# Patient Record
Sex: MALE | Race: WHITE | NOT HISPANIC OR LATINO | Employment: FULL TIME | URBAN - METROPOLITAN AREA
[De-identification: names, ages, dates, MRNs, and addresses within clinical notes are randomized per-mention and may not be internally consistent; named-entity substitution may affect disease eponyms.]

---

## 2023-09-14 LAB
ALANINE AMINOTRANSFERASE (SGPT) (U/L) IN SER/PLAS: 42 U/L (ref 10–52)
ALBUMIN (G/DL) IN SER/PLAS: 4.5 G/DL (ref 3.4–5)
ALKALINE PHOSPHATASE (U/L) IN SER/PLAS: 65 U/L (ref 33–120)
ANION GAP IN SER/PLAS: 13 MMOL/L (ref 10–20)
ASPARTATE AMINOTRANSFERASE (SGOT) (U/L) IN SER/PLAS: 20 U/L (ref 9–39)
BASOPHILS (10*3/UL) IN BLOOD BY AUTOMATED COUNT: 0.03 X10E9/L (ref 0–0.1)
BASOPHILS/100 LEUKOCYTES IN BLOOD BY AUTOMATED COUNT: 0.5 % (ref 0–2)
BILIRUBIN TOTAL (MG/DL) IN SER/PLAS: 0.5 MG/DL (ref 0–1.2)
CALCIUM (MG/DL) IN SER/PLAS: 9.5 MG/DL (ref 8.6–10.3)
CARBON DIOXIDE, TOTAL (MMOL/L) IN SER/PLAS: 30 MMOL/L (ref 21–32)
CHLORIDE (MMOL/L) IN SER/PLAS: 103 MMOL/L (ref 98–107)
CHOLESTEROL (MG/DL) IN SER/PLAS: 213 MG/DL (ref 0–199)
CHOLESTEROL IN HDL (MG/DL) IN SER/PLAS: 43.9 MG/DL
CHOLESTEROL/HDL RATIO: 4.9
COBALAMIN (VITAMIN B12) (PG/ML) IN SER/PLAS: 283 PG/ML (ref 211–911)
CREATININE (MG/DL) IN SER/PLAS: 0.86 MG/DL (ref 0.5–1.3)
EOSINOPHILS (10*3/UL) IN BLOOD BY AUTOMATED COUNT: 0.07 X10E9/L (ref 0–0.7)
EOSINOPHILS/100 LEUKOCYTES IN BLOOD BY AUTOMATED COUNT: 1.1 % (ref 0–6)
ERYTHROCYTE DISTRIBUTION WIDTH (RATIO) BY AUTOMATED COUNT: 12.9 % (ref 11.5–14.5)
ERYTHROCYTE MEAN CORPUSCULAR HEMOGLOBIN CONCENTRATION (G/DL) BY AUTOMATED: 32.9 G/DL (ref 32–36)
ERYTHROCYTE MEAN CORPUSCULAR VOLUME (FL) BY AUTOMATED COUNT: 95 FL (ref 80–100)
ERYTHROCYTES (10*6/UL) IN BLOOD BY AUTOMATED COUNT: 4.81 X10E12/L (ref 4.5–5.9)
GFR MALE: >90 ML/MIN/1.73M2
GLUCOSE (MG/DL) IN SER/PLAS: 94 MG/DL (ref 74–99)
HEMATOCRIT (%) IN BLOOD BY AUTOMATED COUNT: 45.6 % (ref 41–52)
HEMOGLOBIN (G/DL) IN BLOOD: 15 G/DL (ref 13.5–17.5)
IMMATURE GRANULOCYTES/100 LEUKOCYTES IN BLOOD BY AUTOMATED COUNT: 0.2 % (ref 0–0.9)
LDL: 124 MG/DL (ref 0–99)
LEUKOCYTES (10*3/UL) IN BLOOD BY AUTOMATED COUNT: 6.5 X10E9/L (ref 4.4–11.3)
LYMPHOCYTES (10*3/UL) IN BLOOD BY AUTOMATED COUNT: 2.38 X10E9/L (ref 1.2–4.8)
LYMPHOCYTES/100 LEUKOCYTES IN BLOOD BY AUTOMATED COUNT: 36.6 % (ref 13–44)
MONOCYTES (10*3/UL) IN BLOOD BY AUTOMATED COUNT: 0.49 X10E9/L (ref 0.1–1)
MONOCYTES/100 LEUKOCYTES IN BLOOD BY AUTOMATED COUNT: 7.5 % (ref 2–10)
NEUTROPHILS (10*3/UL) IN BLOOD BY AUTOMATED COUNT: 3.52 X10E9/L (ref 1.2–7.7)
NEUTROPHILS/100 LEUKOCYTES IN BLOOD BY AUTOMATED COUNT: 54.1 % (ref 40–80)
NON HDL CHOLESTEROL: 169 MG/DL
PLATELETS (10*3/UL) IN BLOOD AUTOMATED COUNT: 201 X10E9/L (ref 150–450)
POTASSIUM (MMOL/L) IN SER/PLAS: 4.6 MMOL/L (ref 3.5–5.3)
PROTEIN TOTAL: 7.3 G/DL (ref 6.4–8.2)
SODIUM (MMOL/L) IN SER/PLAS: 141 MMOL/L (ref 136–145)
THYROTROPIN (MIU/L) IN SER/PLAS BY DETECTION LIMIT <= 0.05 MIU/L: 1.87 MIU/L (ref 0.44–3.98)
TRIGLYCERIDE (MG/DL) IN SER/PLAS: 228 MG/DL (ref 0–149)
UREA NITROGEN (MG/DL) IN SER/PLAS: 17 MG/DL (ref 6–23)
VLDL: 46 MG/DL (ref 0–40)

## 2023-10-03 ENCOUNTER — OFFICE VISIT (OUTPATIENT)
Dept: BEHAVIORAL HEALTH | Facility: CLINIC | Age: 58
End: 2023-10-03
Payer: COMMERCIAL

## 2023-10-03 DIAGNOSIS — F33.1 MODERATE EPISODE OF RECURRENT MAJOR DEPRESSIVE DISORDER (MULTI): Primary | ICD-10-CM

## 2023-10-03 DIAGNOSIS — F41.9 ANXIETY: ICD-10-CM

## 2023-10-03 PROCEDURE — 99214 OFFICE O/P EST MOD 30 MIN: CPT | Performed by: CLINICAL NURSE SPECIALIST

## 2023-10-03 RX ORDER — SERTRALINE HYDROCHLORIDE 100 MG/1
100 TABLET, FILM COATED ORAL DAILY
Qty: 90 TABLET | Refills: 0 | Status: SHIPPED | OUTPATIENT
Start: 2023-10-03 | End: 2024-01-19 | Stop reason: SDUPTHER

## 2023-10-03 RX ORDER — SERTRALINE HYDROCHLORIDE 100 MG/1
100 TABLET, FILM COATED ORAL ONCE
Status: CANCELLED | OUTPATIENT
Start: 2023-10-03 | End: 2023-10-03

## 2023-10-03 NOTE — PROGRESS NOTES
Outpatient Psychiatry      Subjective patient presents as an established patient for an outpatient appointment /medication management in psychiatry         Assessment/Plan   Diagnosis: There is no problem list on file for this patient.      Treatment Goals:  Specify outcomes written in observable, behavioral terms:   Anxiety: eliminating avoidance (specify encourage continued expression of thoughts and feelings with support for stressors of dealing with medication condition )  Depression: increasing energy, increasing interest in usual activities, increasing motivation, reducing fatigue, and reducing negative automatic thoughts    Treatment Plan/Recommendations: continue self care and wellness efforts and maintain routine health screenings , can follow up for meds in 4 weeks , can call  for treatment and scheduling concerns   Follow-up plan for depression and anxiety was discussed with patient. Sertraline 100 mg daily     Review with patient: Treatment plan reviewed with the patient.  Medication risks/benefit reviewed with the patient      HPI: identifies family as supportive , thinks there is some benefit from sertraline and is willing to continue this medication , psychoeducation provided .attends medical appointments regularly . He spoke about having a tendency to self isolate in a particular room at home , discussed how he could view this differently as this is where he feels more relaxed. Mood still depressed with withdrawls from social events and feels self conscious about these events.no worsened cognitive functioning , pain impacts mood and activities , reviewed notes in the [facility] emr from appointments with other medical providers , no thoughts of harming himself or others , no altered thought processes on assessment , readily participated in discussion     Current Medications:    Current Outpatient Medications:     fremanezumab (Ajovy) 225 mg/1.5 mL auto-injector, INJECT 225 MG (1 PEN) UNDER THE  SKIN ONCE A MONTH AS DIRECTED., Disp: 1.5 mL, Rfl: 11  Sertraline has been 25 mg 3 tablets daily , patient is willing to increase to 100 mg , 1 tablet daily   Medical History:  Past Medical History:   Diagnosis Date    Personal history of other diseases of the nervous system and sense organs     History of hearing loss    Personal history of other specified conditions     History of snoring    Unspecified nonsuppurative otitis media, right ear 10/21/2020    Fluid level behind tympanic membrane of right ear     Surgical History:  Past Surgical History:   Procedure Laterality Date    OTHER SURGICAL HISTORY  08/19/2020    Shoulder surgery    OTHER SURGICAL HISTORY  01/20/2022    Craniotomy     Family History:  No family history on file.  Social History:  Social History     Socioeconomic History    Marital status:      Spouse name: Not on file    Number of children: Not on file    Years of education: Not on file    Highest education level: Not on file   Occupational History    Not on file   Tobacco Use    Smoking status: Not on file    Smokeless tobacco: Not on file   Substance and Sexual Activity    Alcohol use: Not on file    Drug use: Not on file    Sexual activity: Not on file   Other Topics Concern    Not on file   Social History Narrative    Not on file     Social Determinants of Health     Financial Resource Strain: Not on file   Food Insecurity: Not on file   Transportation Needs: Not on file   Physical Activity: Not on file   Stress: Not on file   Social Connections: Not on file   Intimate Partner Violence: Not on file   Housing Stability: Not on file       Additional historical information includes: not applicable     Record Review: brief     Medical Review Of Systems:  Musculoskeletal:positive for balance issues econdary to medical condition   Neurological: positive for coordination problems, gait problems, and speech problems    Psychiatric Review Of Systems:  Depressive Symptoms:Depressed/Irritable  mood, Diminished interest, Worthlessness or guilt, Poor concentration, Fatigue, and N/A  Manic Symptoms:Other: (comment) none   Anxiety Symptoms:Excessive worry, Difficulty controlling worry, Easily fatigued due to worry, Difficulty concentration due to worry, and Restlessness/Feeling on edge due to worry  Disordered Eating Symptoms : not applicable   Inattentive Symptoms: not applicable   Hyperactive/Impulsive Symptoms:not applicable   Oppositional Defiant Symptoms:Other: (comment) not applicable   Trauma Symptoms:has triggers to things that make him more anxious with self doubt , medical condition and resulting changes to activities have contributed to this   Conduct Issues:Other: (comment) not applicable   Psychotic Symptoms:Other: (comment) none   Developmental Concerns:Other: (comment) not applicable   Other Symptoms/Concerns:Other: (comments) none   Delirium/Altered Mental Status Symptoms:Other: (comment) not applicable          Padmnii Gerard, APRN-CNS

## 2023-10-09 ENCOUNTER — PHARMACY VISIT (OUTPATIENT)
Dept: PHARMACY | Facility: CLINIC | Age: 58
End: 2023-10-09
Payer: MEDICARE

## 2023-10-09 ENCOUNTER — SPECIALTY PHARMACY (OUTPATIENT)
Dept: PHARMACY | Facility: CLINIC | Age: 58
End: 2023-10-09

## 2023-10-09 PROCEDURE — RXMED WILLOW AMBULATORY MEDICATION CHARGE

## 2023-10-11 ENCOUNTER — SOCIAL WORK (OUTPATIENT)
Dept: BEHAVIORAL HEALTH | Facility: CLINIC | Age: 58
End: 2023-10-11
Payer: COMMERCIAL

## 2023-10-11 DIAGNOSIS — F33.2 SEVERE EPISODE OF RECURRENT MAJOR DEPRESSIVE DISORDER, WITHOUT PSYCHOTIC FEATURES (MULTI): ICD-10-CM

## 2023-10-11 PROCEDURE — 90837 PSYTX W PT 60 MINUTES: CPT

## 2023-10-11 NOTE — PROGRESS NOTES
"    Type of Interaction: In Office    Start Time: 2:00pm    End Time: 3:00pm    Patient states that his wife became upset with him on the drive to this appointment. Patient's wife expressed that she is caring for everyone in her life (mother, patient) and feels lonely. Patient recognizes that his depression has negatively impacted his marriage. Patient knows he should \"do better' and be more involved in their relationship. Patient continues to struggle in social situations. Patient reports he becomes overwhelmed and abruptly leaves. Spoke with patient about labeling the negative thoughts that he has. Patient did schedule an appointment the [facility]. Plan to meet again in three weeks.     Appointment: Scheduled    Interventions Provided: Solution Focused Therapy      Progress Made: Moderate    Response to Intervention: Patient receptive to suggestions on how to better manage his depressive symptoms.     Plan: Discussed not spending time isolating in one room of his house. Patient would like to try and sleep his bedroom one night a week.     Follow Up / Next Appointment:  November 1st at 2pm      "

## 2023-10-28 DIAGNOSIS — R51.9 HEADACHE, UNSPECIFIED: ICD-10-CM

## 2023-10-31 RX ORDER — VERAPAMIL HYDROCHLORIDE 80 MG/1
TABLET ORAL
Qty: 360 TABLET | Refills: 1 | Status: SHIPPED | OUTPATIENT
Start: 2023-10-31 | End: 2024-04-08

## 2023-11-01 ENCOUNTER — SOCIAL WORK (OUTPATIENT)
Dept: BEHAVIORAL HEALTH | Facility: CLINIC | Age: 58
End: 2023-11-01
Payer: COMMERCIAL

## 2023-11-01 DIAGNOSIS — F33.1 MODERATE EPISODE OF RECURRENT MAJOR DEPRESSIVE DISORDER (MULTI): ICD-10-CM

## 2023-11-01 PROCEDURE — 90837 PSYTX W PT 60 MINUTES: CPT

## 2023-11-01 NOTE — PROGRESS NOTES
Collaborative Care (CoCM)  Progress Note    Type of Interaction: In Office    Start Time: 2:00pm    End Time: 3:00pm    Patient visited his son at his college for parents weekend. Patient reports the trip went well. He tries to stay consistent with taking his wife to dinner. Patient's headaches have lessened now has them once weekly. He does struggle with feeling nauseated. It's worse when he's sleeping at night. Patient wakes up several times during the night. Last night patient was up till 5:30am. Patient is concerned that his  disability insurance company is asking for his therapy notes. Discussed with patient directing them to medical records. Patient speaking about his on going health issues and it's impact on his mental health. Patient thinks he's accepted not being able to work again.    Appointment: Scheduled      Interventions Provided: Solution Focused Therapy      Progress Made: Moderate    Response to Intervention: Receptive     Plan: Continue to work on challenging negative thoughts and not isolating from others.       Follow Up / Next Appointment:  November 29th at 2pm

## 2023-11-07 ENCOUNTER — PHARMACY VISIT (OUTPATIENT)
Dept: PHARMACY | Facility: CLINIC | Age: 58
End: 2023-11-07
Payer: MEDICARE

## 2023-11-07 PROCEDURE — RXMED WILLOW AMBULATORY MEDICATION CHARGE

## 2023-11-08 ENCOUNTER — ANCILLARY PROCEDURE (OUTPATIENT)
Dept: RADIOLOGY | Facility: CLINIC | Age: 58
End: 2023-11-08
Payer: COMMERCIAL

## 2023-11-08 DIAGNOSIS — D33.3 BENIGN NEOPLASM OF CRANIAL NERVES (MULTI): Primary | ICD-10-CM

## 2023-11-08 DIAGNOSIS — G51.0 BELL'S PALSY: ICD-10-CM

## 2023-11-08 PROCEDURE — 70553 MRI BRAIN STEM W/O & W/DYE: CPT

## 2023-11-08 PROCEDURE — 2550000001 HC RX 255 CONTRASTS: Performed by: OTOLARYNGOLOGY

## 2023-11-08 PROCEDURE — 70553 MRI BRAIN STEM W/O & W/DYE: CPT | Performed by: RADIOLOGY

## 2023-11-08 PROCEDURE — A9575 INJ GADOTERATE MEGLUMI 0.1ML: HCPCS | Performed by: OTOLARYNGOLOGY

## 2023-11-08 RX ORDER — GADOTERATE MEGLUMINE 376.9 MG/ML
20 INJECTION INTRAVENOUS
Status: COMPLETED | OUTPATIENT
Start: 2023-11-08 | End: 2023-11-08

## 2023-11-08 RX ADMIN — GADOTERATE MEGLUMINE 20 ML: 376.9 INJECTION INTRAVENOUS at 11:41

## 2023-11-14 ENCOUNTER — SPECIALTY PHARMACY (OUTPATIENT)
Dept: PHARMACY | Facility: CLINIC | Age: 58
End: 2023-11-14

## 2023-11-14 ENCOUNTER — TELEPHONE (OUTPATIENT)
Dept: OPERATING ROOM | Facility: HOSPITAL | Age: 58
End: 2023-11-14
Payer: COMMERCIAL

## 2023-11-14 DIAGNOSIS — D33.3 ACOUSTIC NEUROMA (MULTI): ICD-10-CM

## 2023-11-14 PROBLEM — G51.8: Status: ACTIVE | Noted: 2023-11-14

## 2023-11-14 PROBLEM — B96.89 OTH BACTERIAL AGENTS AS THE CAUSE OF DISEASES CLASSD ELSWHR: Status: ACTIVE | Noted: 2022-01-25

## 2023-11-14 PROBLEM — F32.A DEPRESSION: Status: ACTIVE | Noted: 2023-11-14

## 2023-11-14 PROBLEM — E78.5 HYPERLIPIDEMIA: Status: ACTIVE | Noted: 2023-11-14

## 2023-11-14 PROBLEM — G03.9 MENINGITIS (HHS-HCC): Status: ACTIVE | Noted: 2023-11-14

## 2023-11-14 PROBLEM — M54.50 LUMBAGO: Status: ACTIVE | Noted: 2023-11-14

## 2023-11-14 PROBLEM — G00.9: Status: ACTIVE | Noted: 2023-11-14

## 2023-11-14 PROBLEM — F09 COGNITIVE DYSFUNCTION: Status: ACTIVE | Noted: 2023-11-14

## 2023-11-14 PROBLEM — H93.11 TINNITUS, RIGHT: Status: ACTIVE | Noted: 2023-11-14

## 2023-11-14 PROBLEM — R11.2 NAUSEA AND/OR VOMITING: Status: ACTIVE | Noted: 2023-11-14

## 2023-11-14 PROBLEM — R29.810 FACIAL WEAKNESS: Status: ACTIVE | Noted: 2022-01-15

## 2023-11-14 PROBLEM — H60.90 OTITIS EXTERNA: Status: ACTIVE | Noted: 2023-11-14

## 2023-11-14 PROBLEM — R26.89 OTHER ABNORMALITIES OF GAIT AND MOBILITY: Status: ACTIVE | Noted: 2022-01-10

## 2023-11-14 PROBLEM — G02: Status: ACTIVE | Noted: 2023-11-14

## 2023-11-14 PROBLEM — R42 VERTIGO: Status: ACTIVE | Noted: 2023-11-14

## 2023-11-14 PROBLEM — H65.499 CHRONIC OTITIS MEDIA WITH EFFUSION: Status: ACTIVE | Noted: 2023-11-14

## 2023-11-14 PROBLEM — H90.A22 SENSORINEURAL HEARING LOSS (SNHL) OF LEFT EAR WITH RESTRICTED HEARING OF RIGHT EAR: Status: ACTIVE | Noted: 2023-11-14

## 2023-11-14 PROBLEM — J35.01 CHRONIC TONSILLITIS: Status: ACTIVE | Noted: 2023-11-14

## 2023-11-14 PROBLEM — B37.0 THRUSH, ORAL: Status: ACTIVE | Noted: 2023-11-14

## 2023-11-14 PROBLEM — R26.89 IMBALANCE: Status: ACTIVE | Noted: 2023-11-14

## 2023-11-14 PROBLEM — R05.3 PERSISTENT COUGH FOR 3 WEEKS OR LONGER: Status: ACTIVE | Noted: 2023-11-14

## 2023-11-14 PROBLEM — H93.8X1 PRESSURE SENSATION IN RIGHT EAR: Status: ACTIVE | Noted: 2023-11-14

## 2023-11-14 PROBLEM — H90.3 ASYMMETRIC SNHL (SENSORINEURAL HEARING LOSS): Status: ACTIVE | Noted: 2023-11-14

## 2023-11-14 PROBLEM — H04.121: Status: ACTIVE | Noted: 2023-11-14

## 2023-11-14 PROBLEM — Z79.2 LONG TERM (CURRENT) USE OF ANTIBIOTICS: Status: ACTIVE | Noted: 2022-01-25

## 2023-11-14 PROBLEM — H90.71 MIXED HEARING LOSS OF RIGHT EAR: Status: ACTIVE | Noted: 2023-11-14

## 2023-11-14 PROBLEM — R42 DIZZINESS: Status: ACTIVE | Noted: 2023-11-14

## 2023-11-14 PROBLEM — R26.81 UNSTEADY GAIT: Status: ACTIVE | Noted: 2023-11-14

## 2023-11-14 PROBLEM — T85.79XA: Status: ACTIVE | Noted: 2023-11-14

## 2023-11-14 PROBLEM — Z79.899 OTHER LONG TERM (CURRENT) DRUG THERAPY: Status: ACTIVE | Noted: 2022-01-25

## 2023-11-14 PROBLEM — F32.9 MAJOR DEPRESSION: Status: ACTIVE | Noted: 2023-11-14

## 2023-11-14 PROBLEM — H90.5 LEFT-SIDED SENSORINEURAL HEARING LOSS: Status: ACTIVE | Noted: 2023-11-14

## 2023-11-14 PROBLEM — G96.08: Status: ACTIVE | Noted: 2022-01-25

## 2023-11-14 PROBLEM — R47.01 EXPRESSIVE APHASIA: Status: ACTIVE | Noted: 2023-11-14

## 2023-11-14 PROBLEM — J34.89 RHINORRHEA: Status: ACTIVE | Noted: 2023-11-14

## 2023-11-14 PROBLEM — H73.892 RETRACTION OF TYMPANIC MEMBRANE OF LEFT EAR: Status: ACTIVE | Noted: 2023-11-14

## 2023-11-14 PROBLEM — J35.8 ASYMMETRIC TONSILS: Status: ACTIVE | Noted: 2023-11-14

## 2023-11-14 PROBLEM — E87.6 HYPOKALEMIA: Status: ACTIVE | Noted: 2023-11-14

## 2023-11-14 PROBLEM — F41.1 ANXIETY REACTION: Status: ACTIVE | Noted: 2023-11-14

## 2023-11-14 PROBLEM — H70.11 CHRONIC MASTOIDITIS OF RIGHT SIDE: Status: ACTIVE | Noted: 2023-11-14

## 2023-11-14 PROBLEM — R51.9 PERSISTENT HEADACHES: Status: ACTIVE | Noted: 2023-11-14

## 2023-11-14 PROBLEM — G47.00 INSOMNIA: Status: ACTIVE | Noted: 2023-11-14

## 2023-11-14 RX ORDER — NYSTATIN 100000 [USP'U]/ML
SUSPENSION ORAL 4 TIMES DAILY
COMMUNITY
Start: 2022-03-10 | End: 2023-12-19 | Stop reason: WASHOUT

## 2023-11-14 RX ORDER — PROMETHAZINE HYDROCHLORIDE 25 MG/1
TABLET ORAL
COMMUNITY
Start: 2022-02-09 | End: 2023-12-19 | Stop reason: WASHOUT

## 2023-11-14 RX ORDER — RIBOFLAVIN (VITAMIN B2) 100 MG
200 TABLET ORAL 2 TIMES DAILY
COMMUNITY
Start: 2023-09-11

## 2023-11-14 RX ORDER — GUAIFENESIN 1200 MG
TABLET, EXTENDED RELEASE 12 HR ORAL
COMMUNITY
Start: 2022-01-20

## 2023-11-14 RX ORDER — RIZATRIPTAN BENZOATE 10 MG/1
TABLET ORAL
COMMUNITY

## 2023-11-14 RX ORDER — ACETAMINOPHEN 500 MG
TABLET ORAL
COMMUNITY
Start: 2022-01-18 | End: 2023-12-19 | Stop reason: WASHOUT

## 2023-11-14 RX ORDER — SODIUM CHLORIDE 0.9 % (FLUSH) 0.9 %
SYRINGE (ML) INJECTION
COMMUNITY
Start: 2022-02-09

## 2023-11-14 RX ORDER — ONDANSETRON 4 MG/1
TABLET, FILM COATED ORAL
COMMUNITY
Start: 2022-01-13

## 2023-11-14 RX ORDER — IPRATROPIUM BROMIDE 21 UG/1
SPRAY, METERED NASAL
COMMUNITY

## 2023-11-14 RX ORDER — OXYCODONE HYDROCHLORIDE 5 MG/1
TABLET ORAL
COMMUNITY
Start: 2022-01-18 | End: 2023-12-19 | Stop reason: WASHOUT

## 2023-11-14 RX ORDER — LISDEXAMFETAMINE DIMESYLATE 70 MG/1
1 CAPSULE ORAL DAILY
COMMUNITY
Start: 2009-09-15 | End: 2023-12-19 | Stop reason: WASHOUT

## 2023-11-14 RX ORDER — ONDANSETRON 4 MG/1
1 TABLET, FILM COATED ORAL EVERY 6 HOURS PRN
COMMUNITY
Start: 2022-02-08 | End: 2023-12-19 | Stop reason: WASHOUT

## 2023-11-14 RX ORDER — CYPROHEPTADINE HYDROCHLORIDE 4 MG/1
TABLET ORAL
COMMUNITY
Start: 2022-12-27 | End: 2024-03-27 | Stop reason: WASHOUT

## 2023-11-14 RX ORDER — SUMATRIPTAN 50 MG/1
TABLET, FILM COATED ORAL
COMMUNITY
Start: 2023-09-20

## 2023-11-14 RX ORDER — METHOCARBAMOL 500 MG/1
TABLET, FILM COATED ORAL
COMMUNITY
Start: 2022-02-09

## 2023-11-14 RX ORDER — TOPIRAMATE 25 MG/1
TABLET ORAL
COMMUNITY
Start: 2023-05-05 | End: 2023-12-19 | Stop reason: WASHOUT

## 2023-11-14 RX ORDER — LORAZEPAM 0.5 MG/1
TABLET ORAL
COMMUNITY
Start: 2022-03-28

## 2023-11-14 RX ORDER — VERAPAMIL HYDROCHLORIDE 80 MG/1
1 TABLET ORAL 2 TIMES DAILY
COMMUNITY
Start: 2022-06-08 | End: 2023-12-19 | Stop reason: WASHOUT

## 2023-11-14 NOTE — TELEPHONE ENCOUNTER
Dr. Manrique reviewed recent MRI imaging. Per Dr. Manrique, the patient's imaging is stable from last year and another MRI should be completed in 1 year. Message left with results and instructions. Office number left with voicemail if he has additional questions. No other needs at this time.

## 2023-11-21 ENCOUNTER — TELEMEDICINE (OUTPATIENT)
Dept: BEHAVIORAL HEALTH | Facility: CLINIC | Age: 58
End: 2023-11-21
Payer: COMMERCIAL

## 2023-11-21 DIAGNOSIS — G47.09 OTHER INSOMNIA: ICD-10-CM

## 2023-11-21 DIAGNOSIS — F41.1 GAD (GENERALIZED ANXIETY DISORDER): ICD-10-CM

## 2023-11-21 DIAGNOSIS — F32.1 CURRENT MODERATE EPISODE OF MAJOR DEPRESSIVE DISORDER WITHOUT PRIOR EPISODE (MULTI): ICD-10-CM

## 2023-11-21 PROCEDURE — 99215 OFFICE O/P EST HI 40 MIN: CPT | Performed by: CLINICAL NURSE SPECIALIST

## 2023-11-21 NOTE — PROGRESS NOTES
Outpatient Psychiatry      Subjective   Andrey Drake, a 58 y.o. male, patient presents as an established patient for an outpatient appointment /medication management in psychiatry      Diagnosis:   · RONDA (generalized anxiety disorder) (300.02) (F41.1)   · Insomnia (780.52) (G47.00)   · Depression, major moderate ( primary diagnoses)     Patient Active Problem List   Diagnosis    Major depression    Depression    Crocodile tears syndrome    Cognitive dysfunction    Chronic tonsillitis    Chronic otitis media with effusion    Chronic mastoiditis of right side    Attention deficit disorder    Asymmetric tonsils    Left-sided sensorineural hearing loss    Asymmetric SNHL (sensorineural hearing loss)    Anxiety reaction    Acoustic neuroma (CMS/HCC)    Other long term (current) drug therapy    Nausea and/or vomiting    Mixed hearing loss of right ear    Meningitis due to gram-negative bacteria    Meningitis    Lumbago    Insomnia    Infection associated with neurological device (CMS/HCC)    Imbalance    Hypokalemia    Fungal meningitis    Expressive aphasia    Dryness of right eye due to decreased tear production    Vertigo    Dizziness    Other cranial cerebrospinal fluid leak    Hyperlipidemia    Encounter for adjustment and management of vascular access device    Other abnormalities of gait and mobility    Oth bacterial agents as the cause of diseases classd elswhr    Unsteady gait    Tinnitus, right    Thrush, oral    Sensorineural hearing loss (SNHL) of left ear with restricted hearing of right ear    Rhinorrhea    Retraction of tympanic membrane of left ear    Pressure sensation in right ear    Persistent headaches    Persistent cough for 3 weeks or longer    Otitis externa    Long term (current) use of antibiotics    Facial weakness       Treatment Goals:  Specify outcomes written in observable, behavioral terms:   Anxiety: reducing negative automatic thoughts and reducing physical symptoms of  anxiety  Depression: increasing energy, increasing interest in usual activities, increasing motivation, reducing fatigue, and reducing negative automatic thoughts    Treatment Plan/Recommendations: 4 weeks follow up , can continue self care and wellness efforts and maintain routine health screenings , can call  for treatment and scheduling concerns   Follow-up plan for depression was discussed with patient     Review with patient: Treatment plan reviewed with the patient.  Medication risks/benefit reviewed with the patient    HPI:reviewed the notes in the [facility] emr and discussed medical conditions   mood remains depressed with interference with day to day activities , isolates at times to himself , ruminates on conversations and social events when he wonders what others think of him   has difficulty with word finding and speech , this is worse with anxiety   encouraged him to verbalize his thoughts and feelings   his wife is supportive  he acknowledges he withdrawls from others   there is no indication of previous issues with mood cycling   no psychoses   he actively participated in discussion   affect is congruent , depressed   no panic attacks recently   no thoughts of harming himself or others   denies issues or history of substance abuse  is tolerating sertraline , has been able to try to push himself to do some more activities at times  reviewed the notes in the [facility] emr and discussed medical conditions       there is no indication of previous issues with mood cycling   no psychoses   he actively participated in discussion   affect is congruent , depressed   no panic attacks recently   no thoughts of harming himself or others   denies issues or history of substance abuse  Discussed increasing sertraline dose for depression and anxiety and he agrees to this   He will be scheduled for neuropsychological testing     Review of Systems     Depressive Symptoms: depressed mood,~diminished  interest,~fatigue, but~no change in appetite,~no recent lb weight gain,~no recent lb weight loss,~no insomnia,~not feeling worthless or guilty,~normal concentration,~ability to make decisions,~no suicidal ideation,~no guns or weapons in household.   Manic Symptoms: mood is not irritable or elevated,~self esteem is not grandiose or increased,~no changes in need for sleep,~not more talkative than usual,~does not have flight of ideas or racing thoughts,~no distractibility,~no psychomotor agitation or increased goal-directed activity,~no excessive involvement in pleasurable activities.   Psychotic Symptoms: no hallucinations,~no delusions,~no disorganized speech,~does not have disorganized behavior or catatonia,~no negative symptoms.   Anxiety Symptoms: excessive worry,~difficulty controlling worry,~increased arousal,~restlessness / feeling on edge due to worry, but~no panic attacks,~no concerns about future panic attacks,~no worry about panic attack consequences,~no change in behavior due to panic attacks,~not easily fatigued due to worry,~no difficulty concentrating due to worry,~no irritability due to worry,~no muscle tension due to worry,~no sleep disturbances due to worry,~no specific phobia,~no social phobia,~no obsessions,~no compulsions,~no exposure to traumatic event,~no re-experiencing of traumatic event,~no avoidance of stimuli and number of responsiveness.   Delirium/ Altered Mental Status Symptoms: no disturbances of consciousness,~no diminished ability to focus, sustain, shift attention,~no change in cognition or perceptual disturbances,~symptoms do not fluctuate during the course of the day,~general medical condition is not present.   Disordered Eating Symptoms: weight is not less than 85% of ideal body weight,~no intense fear of gaining weight,~does not have a poor body image,~no restricting of diet and/or excessive exercise,~no purging or laxative use.   Post-traumatic stress disorder symptoms The  patient is currently asymptomatic.   Inattentive Symptoms: does not make careless mistakes often,~does not have difficulty paying attention,~not often disorganized,~does not lose things often,~is not easily distracted,~is not often forgetful,~does not avoid/dislike tasks with sustained mental effort,~listens when spoken to directly,~is able to follow instructions and finish schoolwork.   Conduct Issues: no aggression towards people or animals,~no destruction of property,~no deceitfulness,~does not violate rules.   Other Symptoms/ Concerns: no symptoms of separation anxiety,~no reactive attachment symptoms,~no motor tics,~no vocal tics,~no stuttering,~no phonological problems,~no loss of urine control,~no encopresis,~no intellectual disability,~no self-injurious behaviors,~not somatic and no conversion symptoms,~no gender identity symptoms,~no sleep disorder symptoms,~no impulse control symptoms,~no personality disorder symptoms.       Constitutional: no sleep apnea,~normal sleeping,~no night wakings,~no snoring,~not a picky eater,~normal appetite,~no swallowing problems,~no night terrors,~no nightmares,~no restless sleep,~no snorts/gasps~and~no obesity.   Eyes: no vision test,~does not wear glassess/contacts~and~no blindness.   ENT: no hearing tested,~no hearing loss,~no hearing aid,~no cochlear implant,~no excessive drooling,~no dental problems~and~no recurrent strep throat.   Cardiovascular: no murmur,~no heart defect,~no chest pain,~no palpitations~and~no syncope.   Respiratory: no wheezing~and~no asthma/reactive airway disease.   Gastrointestinal: no constipation,~no abdominal pain,~no nausea,~no vomiting,~no diarrhea,~no blood in stools,~no g-tube~and~no reflux.   Genitourinary: no nocturnal enuresis,~no diurnal enuresis~and~no incontinence.   Musculoskeletal: abnormal movement of extremities,~normal gait~and~no torticollis, but~no arthritis or joint problems,~no myalgias,~no muscle weakness~and~normal hand  preference.   Integumentary: no changes in moles or birthmarks,~no rashes~and~no atopic dermatitis.   Neurological: no symmetrical facies,~no headache,~no head injury,~no seizures,~no staring spells,~no loss of consciousness,~no meningitis/encephalitis,~no cerebral palsy,~no spina bifida,~no stereotypy,~no developmental regression~and~no tics or twitches.   Endocrine: no temperature intolerance,~,~good growth~and~no failure to thrive.   Hematologic/Lymphatic: no anemia~and~no lead poisoning.      Current Medications:  Can increase sertraline dose 100 mg , 1 and 1/2 tablets daily for depression and anxiety     Medical History:  Past Medical History:   Diagnosis Date    Personal history of other diseases of the nervous system and sense organs     History of hearing loss    Personal history of other specified conditions     History of snoring    Unspecified nonsuppurative otitis media, right ear 10/21/2020    Fluid level behind tympanic membrane of right ear     Surgical History:  Past Surgical History:   Procedure Laterality Date    OTHER SURGICAL HISTORY  08/19/2020    Shoulder surgery    OTHER SURGICAL HISTORY  01/20/2022    Craniotomy     Family History:  No family history on file.  Social History:  Social History     Socioeconomic History    Marital status:      Spouse name: Not on file    Number of children: Not on file    Years of education: Not on file    Highest education level: Not on file   Occupational History    Not on file   Tobacco Use    Smoking status: Not on file    Smokeless tobacco: Not on file   Substance and Sexual Activity    Alcohol use: Not on file    Drug use: Not on file    Sexual activity: Not on file   Other Topics Concern    Not on file   Social History Narrative    Not on file     Social Determinants of Health     Financial Resource Strain: Not on file   Food Insecurity: Not on file   Transportation Needs: Not on file   Physical Activity: Not on file   Stress: Not on file   Social  Connections: Not on file   Intimate Partner Violence: Not on file   Housing Stability: Not on file     record Review: moderate      Vitals:  There were no vitals filed for this visit.    Padmini Gerard, APRN-CNS

## 2023-11-29 ENCOUNTER — SOCIAL WORK (OUTPATIENT)
Dept: BEHAVIORAL HEALTH | Facility: CLINIC | Age: 58
End: 2023-11-29
Payer: COMMERCIAL

## 2023-11-29 DIAGNOSIS — F33.1 MODERATE EPISODE OF RECURRENT MAJOR DEPRESSIVE DISORDER (MULTI): ICD-10-CM

## 2023-11-29 PROCEDURE — 90837 PSYTX W PT 60 MINUTES: CPT

## 2023-11-29 NOTE — PROGRESS NOTES
Collaborative Care (CoCM)  Progress Note    Type of Interaction: In Office    Start Time: 2:00pm    End Time: 3:00pm    Patient states that's the holidays went well. His mother and other family members stayed at his house. Patient reports that his wife has been stressed the last several weeks. Patient is concerned that he's not meeting her expectations of him. Patient admits that he walks away from her when they are having an argument. Problem solved ways to better communicate with his wife. Patient continues to express concern about his employer and being able to return to work. Patient doesn't think he would be able to manage the stress and traveling requirements. Patient has neurological evaluation scheduled for the end of March. Spoke with patient about returning to work in another capacity.   Appointment: Scheduled      Interventions Provided: Solution Focused Therapy      Progress Made: Moderate    Response to Intervention: Receptive     Plan:   Continue to assist patient in adjusting to his new life circumstances   Follow Up / Next Appointment:  December 20th

## 2023-12-07 ENCOUNTER — SPECIALTY PHARMACY (OUTPATIENT)
Dept: PHARMACY | Facility: CLINIC | Age: 58
End: 2023-12-07

## 2023-12-07 PROCEDURE — RXMED WILLOW AMBULATORY MEDICATION CHARGE

## 2023-12-08 ENCOUNTER — PHARMACY VISIT (OUTPATIENT)
Dept: PHARMACY | Facility: CLINIC | Age: 58
End: 2023-12-08
Payer: MEDICARE

## 2023-12-19 ENCOUNTER — OFFICE VISIT (OUTPATIENT)
Dept: PRIMARY CARE | Facility: CLINIC | Age: 58
End: 2023-12-19
Payer: COMMERCIAL

## 2023-12-19 VITALS
WEIGHT: 267 LBS | DIASTOLIC BLOOD PRESSURE: 94 MMHG | BODY MASS INDEX: 34.27 KG/M2 | TEMPERATURE: 98 F | SYSTOLIC BLOOD PRESSURE: 152 MMHG | HEIGHT: 74 IN | HEART RATE: 74 BPM

## 2023-12-19 DIAGNOSIS — E78.2 MIXED HYPERLIPIDEMIA: ICD-10-CM

## 2023-12-19 DIAGNOSIS — I10 PRIMARY HYPERTENSION: Primary | ICD-10-CM

## 2023-12-19 DIAGNOSIS — E66.09 CLASS 1 OBESITY DUE TO EXCESS CALORIES WITH SERIOUS COMORBIDITY AND BODY MASS INDEX (BMI) OF 34.0 TO 34.9 IN ADULT: ICD-10-CM

## 2023-12-19 PROBLEM — E66.811 CLASS 1 OBESITY DUE TO EXCESS CALORIES WITH SERIOUS COMORBIDITY AND BODY MASS INDEX (BMI) OF 34.0 TO 34.9 IN ADULT: Status: ACTIVE | Noted: 2023-12-19

## 2023-12-19 PROCEDURE — 3008F BODY MASS INDEX DOCD: CPT | Performed by: INTERNAL MEDICINE

## 2023-12-19 PROCEDURE — 3077F SYST BP >= 140 MM HG: CPT | Performed by: INTERNAL MEDICINE

## 2023-12-19 PROCEDURE — 99214 OFFICE O/P EST MOD 30 MIN: CPT | Performed by: INTERNAL MEDICINE

## 2023-12-19 PROCEDURE — 1036F TOBACCO NON-USER: CPT | Performed by: INTERNAL MEDICINE

## 2023-12-19 PROCEDURE — 3080F DIAST BP >= 90 MM HG: CPT | Performed by: INTERNAL MEDICINE

## 2023-12-19 RX ORDER — VALSARTAN 80 MG/1
80 TABLET ORAL DAILY
Qty: 90 TABLET | Refills: 0 | Status: SHIPPED | OUTPATIENT
Start: 2023-12-19 | End: 2024-03-15

## 2023-12-19 ASSESSMENT — ENCOUNTER SYMPTOMS
DYSURIA: 0
CHILLS: 0
ABDOMINAL PAIN: 0
FEVER: 0
VOMITING: 0
SHORTNESS OF BREATH: 1
AGITATION: 0
NUMBNESS: 1
NAUSEA: 0
COUGH: 0
SORE THROAT: 0
HEADACHES: 1

## 2023-12-19 NOTE — PROGRESS NOTES
"Subjective   Patient ID: Andrey Drake is a 58 y.o. male who presents for No chief complaint on file..    HPI Blood pressures at home 160/110 at home. Patient was taken off Topamax due to problems with memory recall that Topamax was making worse.  Patient is seeing neurology for chronic daily headache.  Patient currently on verapamil for headaches otherwise not on blood pressure medication.  States he was limited on what blood pressure medicine he could take in the past due to topiramate use.  Denies any fever, chills, chest pain, shortness of breath at rest, nausea or vomiting.  He has chronic tingling and numbness of his extremities.    Review of Systems   Constitutional:  Negative for chills and fever.   HENT:  Negative for sore throat.    Respiratory:  Positive for shortness of breath (Shortness of breath with exertion he attributes to deconditioning ordered). Negative for cough.    Cardiovascular:  Negative for chest pain and leg swelling.   Gastrointestinal:  Negative for abdominal pain, nausea and vomiting.   Genitourinary:  Negative for dysuria.   Skin:  Negative for rash.   Neurological:  Positive for numbness and headaches.   Psychiatric/Behavioral:  Negative for agitation.        Objective   BP (!) 152/94   Pulse 74   Temp 36.7 °C (98 °F) (Temporal)   Ht 1.88 m (6' 2\")   Wt 121 kg (267 lb)   BMI 34.28 kg/m²     Physical Exam  Vitals and nursing note reviewed.   Constitutional:       General: He is not in acute distress.     Appearance: Normal appearance. He is obese. He is not toxic-appearing.   HENT:      Head: Normocephalic and atraumatic.      Mouth/Throat:      Mouth: Mucous membranes are moist.      Pharynx: Oropharynx is clear. No oropharyngeal exudate.   Eyes:      Pupils: Pupils are equal, round, and reactive to light.   Cardiovascular:      Rate and Rhythm: Normal rate and regular rhythm.      Pulses: Normal pulses.      Heart sounds: Normal heart sounds.   Pulmonary:      Effort: " Pulmonary effort is normal.      Breath sounds: Normal breath sounds.   Abdominal:      General: There is no distension.      Palpations: Abdomen is soft. There is no mass.      Tenderness: There is no abdominal tenderness.   Musculoskeletal:      Cervical back: Neck supple. No tenderness.      Right lower leg: No edema.      Left lower leg: No edema.   Skin:     General: Skin is warm and dry.   Neurological:      Mental Status: He is alert and oriented to person, place, and time.   Psychiatric:         Mood and Affect: Mood normal.         Behavior: Behavior normal.         Thought Content: Thought content normal.         Judgment: Judgment normal.         Assessment/Plan   Problem List Items Addressed This Visit             ICD-10-CM    Hyperlipidemia E78.5    Relevant Orders    CT cardiac scoring wo IV contrast    Primary hypertension - Primary I10    Relevant Medications    valsartan (Diovan) 80 mg tablet    Other Relevant Orders    CT cardiac scoring wo IV contrast    Class 1 obesity due to excess calories with serious comorbidity and body mass index (BMI) of 34.0 to 34.9 in adult E66.09, Z68.34    Relevant Orders    CT cardiac scoring wo IV contrast     hypertension: Chronic, uncontrolled. We are going to start the patient on valsartan 80 mg daily see him back in 4 weeks for recheck continue verapamil we will also obtain a CT calcium score.    Hyperlipidemia: We discussed possible treatment for this condition we first regarding get his blood pressure under control reassess his ASCVD risk score and also obtain a CT cardiac scoring for further evaluation.  There is a family history of heart disease in his father as well as patient having obesity hypertension hyperlipidemia.

## 2023-12-20 ENCOUNTER — SOCIAL WORK (OUTPATIENT)
Dept: BEHAVIORAL HEALTH | Facility: CLINIC | Age: 58
End: 2023-12-20
Payer: COMMERCIAL

## 2023-12-20 DIAGNOSIS — F33.1 MODERATE EPISODE OF RECURRENT MAJOR DEPRESSIVE DISORDER (MULTI): ICD-10-CM

## 2023-12-20 PROCEDURE — 90837 PSYTX W PT 60 MINUTES: CPT

## 2023-12-20 NOTE — PROGRESS NOTES
Collaborative Care (CoCM)  Progress Note    Type of Interaction: In Office    Start Time: 3:00pm    End Time: 4:00pm    Patient went to his PCP and was started on high blood pressure medication. He was also told that he has high cholesterol. Patient's wife has been encouraging him to diet and exercise. Patient contributes his weight gain to his health issues and limited exercise. Patient worries about what his health issues will mean long term (dementia). Encouraged patient to speak with his neurologist about his concerns. Patient states that he is still hasn't been returning his friends/co-workers calls. He's unsure if these relationships are important to him. Discussed with patient that this could be a symptom of his depression. Encouraged him to consider connecting with these people. Patient has been thinking about his options regarding work. Patient is considering focusing on is hobbies.     Appointment: Scheduled      Interventions Provided: Solution Focused Therapy      Progress Made: Moderate    Response to Intervention: Receptive         Plan:       Follow Up / Next Appointment:

## 2024-01-02 ENCOUNTER — ANCILLARY PROCEDURE (OUTPATIENT)
Dept: RADIOLOGY | Facility: CLINIC | Age: 59
End: 2024-01-02
Payer: COMMERCIAL

## 2024-01-02 DIAGNOSIS — I10 PRIMARY HYPERTENSION: ICD-10-CM

## 2024-01-02 DIAGNOSIS — E78.2 MIXED HYPERLIPIDEMIA: ICD-10-CM

## 2024-01-02 DIAGNOSIS — E66.09 CLASS 1 OBESITY DUE TO EXCESS CALORIES WITH SERIOUS COMORBIDITY AND BODY MASS INDEX (BMI) OF 34.0 TO 34.9 IN ADULT: ICD-10-CM

## 2024-01-02 PROCEDURE — 75571 CT HRT W/O DYE W/CA TEST: CPT

## 2024-01-06 ENCOUNTER — SPECIALTY PHARMACY (OUTPATIENT)
Dept: PHARMACY | Facility: CLINIC | Age: 59
End: 2024-01-06

## 2024-01-06 PROCEDURE — RXMED WILLOW AMBULATORY MEDICATION CHARGE

## 2024-01-08 NOTE — RESULT ENCOUNTER NOTE
Patient's coronary calcium score was 85.63.  This puts him in the lower risk category for a cardiac event within the next 5 years.  Will discuss the results of this test and what this means for him at his next office visit in 1 week.

## 2024-01-09 ENCOUNTER — PHARMACY VISIT (OUTPATIENT)
Dept: PHARMACY | Facility: CLINIC | Age: 59
End: 2024-01-09
Payer: MEDICARE

## 2024-01-16 ENCOUNTER — OFFICE VISIT (OUTPATIENT)
Dept: PRIMARY CARE | Facility: CLINIC | Age: 59
End: 2024-01-16
Payer: COMMERCIAL

## 2024-01-16 VITALS
WEIGHT: 266 LBS | HEIGHT: 74 IN | HEART RATE: 77 BPM | SYSTOLIC BLOOD PRESSURE: 116 MMHG | TEMPERATURE: 98 F | BODY MASS INDEX: 34.14 KG/M2 | DIASTOLIC BLOOD PRESSURE: 77 MMHG

## 2024-01-16 DIAGNOSIS — R93.1 AGATSTON CORONARY ARTERY CALCIUM SCORE LESS THAN 100: ICD-10-CM

## 2024-01-16 DIAGNOSIS — E78.2 MIXED HYPERLIPIDEMIA: Primary | ICD-10-CM

## 2024-01-16 DIAGNOSIS — E66.09 CLASS 1 OBESITY DUE TO EXCESS CALORIES WITH SERIOUS COMORBIDITY AND BODY MASS INDEX (BMI) OF 34.0 TO 34.9 IN ADULT: ICD-10-CM

## 2024-01-16 DIAGNOSIS — Z71.3 ENCOUNTER FOR WEIGHT LOSS COUNSELING: ICD-10-CM

## 2024-01-16 PROBLEM — Z86.018 HISTORY OF ACOUSTIC NEUROMA: Status: ACTIVE | Noted: 2022-11-17

## 2024-01-16 PROBLEM — N49.1: Status: ACTIVE | Noted: 2023-01-19

## 2024-01-16 PROBLEM — R63.5 WEIGHT GAIN: Status: ACTIVE | Noted: 2024-01-16

## 2024-01-16 PROBLEM — Z20.822 EXPOSURE TO SEVERE ACUTE RESPIRATORY SYNDROME CORONAVIRUS 2 (SARS-COV-2): Status: ACTIVE | Noted: 2022-02-01

## 2024-01-16 PROBLEM — N52.9 ERECTILE DYSFUNCTION: Status: ACTIVE | Noted: 2024-01-16

## 2024-01-16 PROBLEM — R20.0 NUMBNESS AND TINGLING SENSATION OF SKIN: Status: ACTIVE | Noted: 2024-01-16
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
## 2024-01-16 PROBLEM — R20.2 NUMBNESS AND TINGLING SENSATION OF SKIN: Status: ACTIVE | Noted: 2024-01-16

## 2024-01-16 PROBLEM — R53.83 FATIGUE: Status: ACTIVE | Noted: 2024-01-16

## 2024-01-16 PROBLEM — Z86.69 HISTORY OF HEARING LOSS: Status: ACTIVE | Noted: 2024-01-16

## 2024-01-16 PROBLEM — G43.019 COMMON MIGRAINE WITH INTRACTABLE MIGRAINE: Status: ACTIVE | Noted: 2024-01-16

## 2024-01-16 PROBLEM — M54.6 ACUTE THORACIC BACK PAIN: Status: ACTIVE | Noted: 2023-05-31

## 2024-01-16 PROBLEM — N50.1: Status: ACTIVE | Noted: 2024-01-16

## 2024-01-16 PROBLEM — N50.819 PERSISTENT PAIN IN TESTICLE: Status: ACTIVE | Noted: 2024-01-16

## 2024-01-16 PROBLEM — M25.542 PAIN OF JOINT OF BOTH HANDS: Status: ACTIVE | Noted: 2023-05-31

## 2024-01-16 PROBLEM — M25.541 PAIN OF JOINT OF BOTH HANDS: Status: ACTIVE | Noted: 2023-05-31

## 2024-01-16 PROCEDURE — 3078F DIAST BP <80 MM HG: CPT | Performed by: INTERNAL MEDICINE

## 2024-01-16 PROCEDURE — 99214 OFFICE O/P EST MOD 30 MIN: CPT | Performed by: INTERNAL MEDICINE

## 2024-01-16 PROCEDURE — 3008F BODY MASS INDEX DOCD: CPT | Performed by: INTERNAL MEDICINE

## 2024-01-16 PROCEDURE — 1036F TOBACCO NON-USER: CPT | Performed by: INTERNAL MEDICINE

## 2024-01-16 PROCEDURE — 3074F SYST BP LT 130 MM HG: CPT | Performed by: INTERNAL MEDICINE

## 2024-01-16 RX ORDER — ROSUVASTATIN CALCIUM 5 MG/1
5 TABLET, COATED ORAL DAILY
Qty: 90 TABLET | Refills: 3 | Status: SHIPPED | OUTPATIENT
Start: 2024-01-16 | End: 2025-01-15

## 2024-01-16 ASSESSMENT — ENCOUNTER SYMPTOMS
VOMITING: 0
SORE THROAT: 1
NAUSEA: 0
CHILLS: 0
SHORTNESS OF BREATH: 0
COUGH: 0
FEVER: 0
ABDOMINAL PAIN: 0

## 2024-01-16 NOTE — PROGRESS NOTES
"Subjective   Patient ID: Andrey Drake is a 58 y.o. male who presents for Follow-up (1 month BP and CT results ).    HPI patient follows up for 1 month blood pressure check he had valsartan added to his blood pressure medication regimen last month blood pressures have been much better controlled blood pressure in the office today 116/77.  Denies any fever, chills, chest pain or shortness of breath, nausea, vomiting.  No low blood pressures.  Also reviewed his CT scan did show plaque in the LAD of the heart that was minimal but patient does have elevated cholesterol and family history therefore we discussed starting him on statin therapy for control of cholesterol and stabilization of plaque that is present and he is agreeable to this.  He has an ASCVD risk score 7.2% which is very close to Threshold to start him on pharmacological therapy and because we do know he has plaque based upon the CT cardiac score it is reasonable to start him on therapy.  He is also interested in weight loss therapy was bound would like us to send it to his pharmacy to see if he can afford the medication.  He has had increasing weight gain difficulty with losing weight with traditional methods.    Review of Systems   Constitutional:  Negative for chills and fever.   HENT:  Positive for sore throat.    Eyes:  Negative for visual disturbance.   Respiratory:  Negative for cough and shortness of breath.    Cardiovascular:  Negative for chest pain.   Gastrointestinal:  Negative for abdominal pain, nausea and vomiting.   Skin:  Negative for rash.   Neurological:  Negative for syncope.       Objective   /77   Pulse 77   Temp 36.7 °C (98 °F) (Temporal)   Ht 1.88 m (6' 2\")   Wt 121 kg (266 lb)   BMI 34.15 kg/m²     Physical Exam  Vitals and nursing note reviewed.   Constitutional:       Appearance: Normal appearance. He is obese.   HENT:      Head: Normocephalic and atraumatic.      Mouth/Throat:      Mouth: Mucous membranes are " moist.   Eyes:      Extraocular Movements: Extraocular movements intact.      Pupils: Pupils are equal, round, and reactive to light.   Cardiovascular:      Rate and Rhythm: Normal rate and regular rhythm.      Heart sounds: Normal heart sounds.   Pulmonary:      Effort: Pulmonary effort is normal. No respiratory distress.      Breath sounds: Normal breath sounds. No wheezing or rales.   Musculoskeletal:      Cervical back: Neck supple. No tenderness.   Skin:     General: Skin is warm and dry.   Neurological:      Mental Status: He is alert and oriented to person, place, and time. Mental status is at baseline.   Psychiatric:         Mood and Affect: Mood normal.         Behavior: Behavior normal.         Thought Content: Thought content normal.         Judgment: Judgment normal.         Assessment/Plan   Problem List Items Addressed This Visit             ICD-10-CM    Hyperlipidemia - Primary E78.5    Relevant Medications    rosuvastatin (Crestor) 5 mg tablet    Other Relevant Orders    Comprehensive metabolic panel    Lipid panel    Class 1 obesity due to excess calories with serious comorbidity and body mass index (BMI) of 34.0 to 34.9 in adult E66.09, Z68.34    Relevant Medications    tirzepatide, weight loss, (Zepbound) 2.5 mg/0.5 mL injection    Encounter for weight loss counseling Z71.3    Relevant Medications    tirzepatide, weight loss, (Zepbound) 2.5 mg/0.5 mL injection    Agatston coronary artery calcium score less than 100 R93.1     Hyperlipidemia: Chronic, uncontrolled we are going to start the patient on Crestor 5 mg at bedtime and recheck labs in 3 months.  We reviewed his CT coronary calcium score in detail today with him in the office as well for his prior labs.  We have decided that he is a candidate for control of his cholesterol based upon his risk factors, family history as well as plaque present in the arteries seen on CT calcium score.    Encounter for weight loss counseling/obesity: We will  start the patient on zepbound once weekly see him back in 1 month for recheck.  If his insurance does not cover this medication we will try Wegovy and lastly if this is not an option for him compounded semaglutide can be obtained through [facility] pharmacy.  Discussed exercise, weight loss through diet.

## 2024-01-17 ENCOUNTER — SOCIAL WORK (OUTPATIENT)
Dept: BEHAVIORAL HEALTH | Facility: CLINIC | Age: 59
End: 2024-01-17
Payer: COMMERCIAL

## 2024-01-17 DIAGNOSIS — F33.2 SEVERE EPISODE OF RECURRENT MAJOR DEPRESSIVE DISORDER, WITHOUT PSYCHOTIC FEATURES (MULTI): ICD-10-CM

## 2024-01-17 PROCEDURE — 90837 PSYTX W PT 60 MINUTES: CPT

## 2024-01-17 NOTE — PROGRESS NOTES
"Collaborative Care (CoCM)  Progress Note    Type of Interaction: In Office    Start Time: 1:00pm    End Time: 2:00pm    Patient states that his depressive symptoms have worsened the past month. Patient insurance was canceled this month. He's not paying for cobra. Patient  reports this triggered him spend  three days in a row in his down stairs room. Patient reports he only left the room to use the bathroom. Patient recognizes that his behavior has negatively impacted his marriage. Discussed ways patient could decrease his avoidant behaviors. Patient continues to report that he can't \"get over the hump\". Patient would like his wife to be present at next appointment so this provider can get her perspective.    Appointment: Scheduled        Interventions Provided: Solution Focused Therapy      Progress Made: Minimum    Response to Intervention: Receptive        Plan:   Encouraged patient to improve his communication and staying present             Follow Up / Next Appointment:  Februaray 28th      "

## 2024-01-18 ENCOUNTER — TELEPHONE (OUTPATIENT)
Dept: PRIMARY CARE | Facility: CLINIC | Age: 59
End: 2024-01-18
Payer: COMMERCIAL

## 2024-01-18 DIAGNOSIS — E66.09 CLASS 1 OBESITY DUE TO EXCESS CALORIES WITH SERIOUS COMORBIDITY AND BODY MASS INDEX (BMI) OF 34.0 TO 34.9 IN ADULT: ICD-10-CM

## 2024-01-18 DIAGNOSIS — Z71.3 ENCOUNTER FOR WEIGHT LOSS COUNSELING: Primary | ICD-10-CM

## 2024-01-18 RX ORDER — SEMAGLUTIDE 0.25 MG/.5ML
0.25 INJECTION, SOLUTION SUBCUTANEOUS
Qty: 2 ML | Refills: 0 | Status: SHIPPED | OUTPATIENT
Start: 2024-01-18 | End: 2024-02-09

## 2024-01-19 ENCOUNTER — DOCUMENTATION (OUTPATIENT)
Dept: BEHAVIORAL HEALTH | Facility: CLINIC | Age: 59
End: 2024-01-19
Payer: COMMERCIAL

## 2024-01-19 DIAGNOSIS — F33.1 MODERATE EPISODE OF RECURRENT MAJOR DEPRESSIVE DISORDER (MULTI): ICD-10-CM

## 2024-01-19 RX ORDER — SERTRALINE HYDROCHLORIDE 100 MG/1
150 TABLET, FILM COATED ORAL DAILY
Qty: 135 TABLET | Refills: 0 | Status: SHIPPED | OUTPATIENT
Start: 2024-01-19 | End: 2024-01-23 | Stop reason: SDUPTHER

## 2024-01-19 NOTE — PROGRESS NOTES
Nonbillable note : pharmacy requested sertraline refill , reviewed med reconciliation , reviewed most recent psych appointment note in the [facility] emr and sent script to pharmacy [facility]

## 2024-01-23 ENCOUNTER — OFFICE VISIT (OUTPATIENT)
Dept: BEHAVIORAL HEALTH | Facility: CLINIC | Age: 59
End: 2024-01-23
Payer: COMMERCIAL

## 2024-01-23 VITALS
DIASTOLIC BLOOD PRESSURE: 86 MMHG | SYSTOLIC BLOOD PRESSURE: 128 MMHG | BODY MASS INDEX: 34.01 KG/M2 | HEIGHT: 74 IN | HEART RATE: 86 BPM | WEIGHT: 265 LBS

## 2024-01-23 DIAGNOSIS — F33.1 MODERATE EPISODE OF RECURRENT MAJOR DEPRESSIVE DISORDER (MULTI): ICD-10-CM

## 2024-01-23 PROCEDURE — 99214 OFFICE O/P EST MOD 30 MIN: CPT | Performed by: CLINICAL NURSE SPECIALIST

## 2024-01-23 PROCEDURE — 1036F TOBACCO NON-USER: CPT | Performed by: CLINICAL NURSE SPECIALIST

## 2024-01-23 PROCEDURE — 3008F BODY MASS INDEX DOCD: CPT | Performed by: CLINICAL NURSE SPECIALIST

## 2024-01-23 PROCEDURE — 3079F DIAST BP 80-89 MM HG: CPT | Performed by: CLINICAL NURSE SPECIALIST

## 2024-01-23 PROCEDURE — 3074F SYST BP LT 130 MM HG: CPT | Performed by: CLINICAL NURSE SPECIALIST

## 2024-01-23 RX ORDER — SERTRALINE HYDROCHLORIDE 100 MG/1
TABLET, FILM COATED ORAL
Qty: 180 TABLET | Refills: 1 | Status: SHIPPED | OUTPATIENT
Start: 2024-01-23 | End: 2024-05-22 | Stop reason: SDUPTHER

## 2024-01-23 NOTE — PROGRESS NOTES
Outpatient Psychiatry      Subjective   Andrey Drake, a 58 y.o. male presents as an established patient for an outpatient appointment /medication management in psychiatry         Diagnosis:    · RONDA (generalized anxiety disorder) (300.02) (F41.1)   · Insomnia (780.52) (G47.00)   · Depression, major moderate ( primary diagnoses)    Patient Active Problem List   Diagnosis    Major depression    Depression    Crocodile tears syndrome    Cognitive dysfunction    Chronic tonsillitis    Chronic otitis media with effusion    Chronic mastoiditis of right side    Attention deficit disorder    Asymmetric tonsils    Left-sided sensorineural hearing loss    Asymmetric SNHL (sensorineural hearing loss)    Anxiety reaction    Acoustic neuroma (CMS/HCC)    Other long term (current) drug therapy    Nausea and/or vomiting    Mixed hearing loss of right ear    Meningitis due to gram-negative bacteria    Meningitis    Lumbago    Insomnia    Infection associated with neurological device (CMS/HCC)    Imbalance    Hypokalemia    Fungal meningitis    Expressive aphasia    Dryness of right eye due to decreased tear production    Vertigo    Dizziness    Other cranial cerebrospinal fluid leak    Hyperlipidemia    Encounter for adjustment and management of vascular access device    Other abnormalities of gait and mobility    Oth bacterial agents as the cause of diseases classd elswhr    Unsteady gait    Tinnitus, right    Thrush, oral    Sensorineural hearing loss (SNHL) of left ear with restricted hearing of right ear    Rhinorrhea    Retraction of tympanic membrane of left ear    Pressure sensation in right ear    Persistent headaches    Persistent cough for 3 weeks or longer    Otitis externa    Long term (current) use of antibiotics    Facial weakness    Primary hypertension    Class 1 obesity due to excess calories with serious comorbidity and body mass index (BMI) of 34.0 to 34.9 in adult    Accidental fall    Acute thoracic back  pain    Common migraine with intractable migraine    Erectile dysfunction    Exposure to severe acute respiratory syndrome coronavirus 2 (SARS-CoV-2)    Fatigue    History of acoustic neuroma    History of hearing loss    Infarction of testicle    Numbness and tingling sensation of skin    Pain of joint of both hands    Persistent pain in testicle    Spermatic cord inflammation    Weight gain    Encounter for weight loss counseling    Agatston coronary artery calcium score less than 100       Treatment goals and recommendations Specify outcomes written in observable, behavioral terms:   4 weeks follow up , can continue self care and wellness efforts and maintain routine health screenings , can call  for treatment and scheduling concerns    Anxiety: reducing negative automatic thoughts and reducing physical symptoms of anxiety  Depression: increasing energy, increasing interest in usual activities, increasing motivation, reducing fatigue, and reducing negative automatic thoughts    Review with patient: Treatment plan reviewed with the patient.  Medication risks/benefit reviewed with the patient, patient consents to continuing medication for depression and anxiety     HPI:  reviewed the notes in the [facility] emr from other medical providers    mood remains depressed with interference with day to day activities , isolates at times to himself , sometimes ruminates on conversations and social events when he wonders what others think of him   has difficulty with word finding and speech , this is worse with anxiety   encouraged him to verbalize his thoughts and feelings   He identifies his wife as supportive  there is no indication of previous issues with mood cycling   no psychoses   he actively participated in discussion   affect is congruent , depressed   no panic attacks recently   no thoughts of harming himself or others   denies issues or history of substance abuse  is tolerating sertraline , has been able  to try to push himself to do some more activities at times      there is no indication of previous issues with mood cycling   no psychoses   he actively participated in discussion   affect is congruent  no panic attacks recently   no thoughts of harming himself or others   denies current issues or history of substance abuse    Review of Systems     Depressive Symptoms: depressed mood,~diminished interest,~fatigue, but~no change in appetite,~no recent lb weight gain,~no recent lb weight loss,~no insomnia,~not feeling worthless or guilty,~normal concentration,~ability to make decisions,~no suicidal ideation,~no guns or weapons in household.   Manic Symptoms: mood is not irritable or elevated,~self esteem is not grandiose or increased,~no changes in need for sleep,~not more talkative than usual,~does not have flight of ideas or racing thoughts,~no distractibility,~no psychomotor agitation or increased goal-directed activity,~no excessive involvement in pleasurable activities.   Psychotic Symptoms: no hallucinations,~no delusions,~no disorganized speech,~does not have disorganized behavior or catatonia,~no negative symptoms.   Anxiety Symptoms: excessive worry,~difficulty controlling worry,~increased arousal,~restlessness / feeling on edge due to worry, but~no panic attacks,~no concerns about future panic attacks,~no worry about panic attack consequences,~no change in behavior due to panic attacks,~not easily fatigued due to worry,~no difficulty concentrating due to worry,~no irritability due to worry,~no muscle tension due to worry,~no sleep disturbances due to worry,~no specific phobia,~no social phobia,~no obsessions,~no compulsions,~no exposure to traumatic event,~no re-experiencing of traumatic event,~no avoidance of stimuli and number of responsiveness.   Delirium/ Altered Mental Status Symptoms: no disturbances of consciousness,~no diminished ability to focus, sustain, shift attention,~no change in cognition or  perceptual disturbances,~symptoms do not fluctuate during the course of the day,~general medical condition is not present.   Disordered Eating Symptoms: weight is not less than 85% of ideal body weight,~no intense fear of gaining weight,~does not have a poor body image,~no restricting of diet and/or excessive exercise,~no purging or laxative use.   Post-traumatic stress disorder symptoms The patient is currently asymptomatic.   Inattentive Symptoms: does not make careless mistakes often,~does not have difficulty paying attention,~not often disorganized,~does not lose things often,~is not easily distracted,~is not often forgetful,~does not avoid/dislike tasks with sustained mental effort,~listens when spoken to directly,~is able to follow instructions and finish schoolwork.   Conduct Issues: no aggression towards people or animals,~no destruction of property,~no deceitfulness,~does not violate rules.   Other Symptoms/ Concerns: no symptoms of separation anxiety,~no reactive attachment symptoms,~no motor tics,~no vocal tics,~no stuttering,~no phonological problems,~no loss of urine control,~no encopresis,~no intellectual disability,~no self-injurious behaviors,~not somatic and no conversion symptoms,~no gender identity symptoms,~no sleep disorder symptoms,~no impulse control symptoms,~no personality disorder symptoms.       Constitutional: no sleep apnea,~normal sleeping,~no night wakings,~no snoring,~not a picky eater,~normal appetite,~no swallowing problems,~no night terrors,~no nightmares,~no restless sleep,~no snorts/gasps~and~no obesity.   Eyes: no vision test,~does not wear glassess/contacts~and~no blindness.   ENT: no hearing tested,~no hearing loss,~no hearing aid,~no cochlear implant,~no excessive drooling,~no dental problems~and~no recurrent strep throat.   Cardiovascular: no murmur,~no heart defect,~no chest pain,~no palpitations~and~no syncope.   Respiratory: no wheezing~and~no asthma/reactive airway  disease.   Gastrointestinal: no constipation,~no abdominal pain,~no nausea,~no vomiting,~no diarrhea,~no blood in stools,~no g-tube~and~no reflux.   Genitourinary: no nocturnal enuresis,~no diurnal enuresis~and~no incontinence.   Musculoskeletal: abnormal movement of extremities,~normal gait~and~no torticollis, but~no arthritis or joint problems,~no myalgias,~no muscle weakness~and~normal hand preference.   Integumentary: no changes in moles or birthmarks,~no rashes~and~no atopic dermatitis.   Neurological: no symmetrical facies,~no headache,~no head injury,~no seizures,~no staring spells,~no loss of consciousness,~no meningitis/encephalitis,~no cerebral palsy,~no spina bifida,~no stereotypy,~no developmental regression~and~no tics or twitches.   Endocrine: no temperature intolerance,~,~good growth~and~no failure to thrive.   Hematologic/Lymphatic: no anemia~and~no lead poisoning.     Current Outpatient Medications:     acetaminophen (TylenoL) 325 mg capsule, Take by mouth., Disp: , Rfl:     cyproheptadine (Periactin) 4 mg tablet, PLEASE SEE ATTACHED FOR DETAILED DIRECTIONS, Disp: , Rfl:     fremanezumab (Ajovy) 225 mg/1.5 mL auto-injector, INJECT 225 MG (1 PEN) UNDER THE SKIN ONCE A MONTH AS DIRECTED., Disp: 1.5 mL, Rfl: 11    ipratropium (Atrovent) 21 mcg (0.03 %) nasal spray, SPRAY 2 SPRAYS INTO EACH NOSTRIL 3 TIMES A DAY AS NEEDED, Disp: , Rfl:     LORazepam (Ativan) 0.5 mg tablet, Take by mouth., Disp: , Rfl:     methocarbamol (Robaxin) 500 mg tablet, 2 tablet EVERY 8 HOURS (route: oral), Disp: , Rfl:     ondansetron (Zofran) 4 mg tablet, 1 tablet EVERY 8 HOURS (route: oral), Disp: , Rfl:     rizatriptan (Maxalt) 10 mg tablet, TAKE 1 TABLET AT ONSET OF HEADACHE MAY REPEAT EVERY 2 HOURS AS NEEDED. MAX 3 TABS/24 HRS, Disp: , Rfl:     rosuvastatin (Crestor) 5 mg tablet, Take 1 tablet (5 mg) by mouth once daily., Disp: 90 tablet, Rfl: 3    semaglutide, weight loss, (Wegovy) 0.25 mg/0.5 mL pen injector, Inject 0.25  mg under the skin 1 (one) time per week for 4 doses., Disp: 2 mL, Rfl: 0    sertraline (Zoloft) 100 mg tablet, Take 1.5 tablets (150 mg) by mouth once daily., Disp: 135 tablet, Rfl: 0    sodium chloride 0.9% (Normal Saline Flush) flush, 10 mL EVERY 8 HOURS (route: injection), Disp: , Rfl:     SUMAtriptan (Imitrex) 50 mg tablet, TAKE 1 TABLET FOR HEADACHE RELIEF. MAY REPEAT EVERY 2 HOURS. MAX 200MG/DAY., Disp: , Rfl:     tetrahydrozoline-zinc (Visine-AC) 0.05-0.25 % ophthalmic solution, Administer into affected eye(s) twice a day., Disp: , Rfl:     valsartan (Diovan) 80 mg tablet, Take 1 tablet (80 mg) by mouth once daily., Disp: 90 tablet, Rfl: 0    verapamil (Calan) 80 mg tablet, INCREASE AS DIRECTED TO 2 TABLETS TWICE A DAY, Disp: 360 tablet, Rfl: 1    Vitamin B-2 100 mg tablet tablet, Take 2 tablets (200 mg) by mouth 2 times a day., Disp: , Rfl:     white petrolatum-mineral oiL 94-3 % ophthalmic ointment, Apply to affected eye(s)., Disp: , Rfl:   Medical History:  Past Medical History:   Diagnosis Date    Personal history of other diseases of the nervous system and sense organs     History of hearing loss    Personal history of other specified conditions     History of snoring    Unspecified nonsuppurative otitis media, right ear 10/21/2020    Fluid level behind tympanic membrane of right ear     Surgical History:  Past Surgical History:   Procedure Laterality Date    OTHER SURGICAL HISTORY  08/19/2020    Shoulder surgery    OTHER SURGICAL HISTORY  01/20/2022    Craniotomy     Family History:  No family history on file.  Social History:  Social History     Socioeconomic History    Marital status:      Spouse name: Not on file    Number of children: Not on file    Years of education: Not on file    Highest education level: Not on file   Occupational History    Not on file   Tobacco Use    Smoking status: Never    Smokeless tobacco: Never   Substance and Sexual Activity    Alcohol use: Yes    Drug use: Never     Sexual activity: Not on file   Other Topics Concern    Not on file   Social History Narrative    Not on file     Social Determinants of Health     Financial Resource Strain: Not on file   Food Insecurity: Not on file   Transportation Needs: Not on file   Physical Activity: Not on file   Stress: Not on file   Social Connections: Not on file   Intimate Partner Violence: Not on file   Housing Stability: Not on file     Record Review: brief    Vitals:  Vitals:    01/23/24 1445   BP: 128/86   Pulse: 86     Padmini Gerard APRN-CNS

## 2024-02-01 ENCOUNTER — SPECIALTY PHARMACY (OUTPATIENT)
Dept: PHARMACY | Facility: CLINIC | Age: 59
End: 2024-02-01

## 2024-02-27 ENCOUNTER — TELEMEDICINE (OUTPATIENT)
Dept: BEHAVIORAL HEALTH | Facility: CLINIC | Age: 59
End: 2024-02-27
Payer: COMMERCIAL

## 2024-02-27 DIAGNOSIS — F32.1 CURRENT MODERATE EPISODE OF MAJOR DEPRESSIVE DISORDER WITHOUT PRIOR EPISODE (MULTI): Primary | ICD-10-CM

## 2024-02-27 DIAGNOSIS — G47.00 INSOMNIA, UNSPECIFIED TYPE: ICD-10-CM

## 2024-02-27 DIAGNOSIS — F41.1 GAD (GENERALIZED ANXIETY DISORDER): ICD-10-CM

## 2024-02-27 PROCEDURE — 1036F TOBACCO NON-USER: CPT | Performed by: CLINICAL NURSE SPECIALIST

## 2024-02-27 PROCEDURE — 99214 OFFICE O/P EST MOD 30 MIN: CPT | Performed by: CLINICAL NURSE SPECIALIST

## 2024-02-27 PROCEDURE — 3008F BODY MASS INDEX DOCD: CPT | Performed by: CLINICAL NURSE SPECIALIST

## 2024-02-27 RX ORDER — TRAZODONE HYDROCHLORIDE 50 MG/1
50 TABLET ORAL NIGHTLY
Qty: 30 TABLET | Refills: 1 | Status: SHIPPED | OUTPATIENT
Start: 2024-02-27 | End: 2024-05-22 | Stop reason: SDUPTHER

## 2024-02-27 NOTE — PROGRESS NOTES
Outpatient Psychiatry      Subjective     Andrey Drake, a 58 y.o. male, for presents as an established patient for an outpatient appointment /medication management in psychiatry       Diagnosis: ·      RONDA (generalized anxiety disorder) (300.02) (F41.1)   · Insomnia (780.52) (G47.00)   · Depression, major moderate ( primary diagnoses)     Patient Active Problem List   Diagnosis    Major depression    Depression    Crocodile tears syndrome    Cognitive dysfunction    Chronic tonsillitis    Chronic otitis media with effusion    Chronic mastoiditis of right side    Attention deficit disorder    Asymmetric tonsils    Left-sided sensorineural hearing loss    Asymmetric SNHL (sensorineural hearing loss)    Anxiety reaction    Acoustic neuroma (CMS/HCC)    Other long term (current) drug therapy    Nausea and/or vomiting    Mixed hearing loss of right ear    Meningitis due to gram-negative bacteria    Meningitis    Lumbago    Insomnia    Infection associated with neurological device (CMS/HCC)    Imbalance    Hypokalemia    Fungal meningitis    Expressive aphasia    Dryness of right eye due to decreased tear production    Vertigo    Dizziness    Other cranial cerebrospinal fluid leak    Hyperlipidemia    Encounter for adjustment and management of vascular access device    Other abnormalities of gait and mobility    Oth bacterial agents as the cause of diseases classd elswhr    Unsteady gait    Tinnitus, right    Thrush, oral    Sensorineural hearing loss (SNHL) of left ear with restricted hearing of right ear    Rhinorrhea    Retraction of tympanic membrane of left ear    Pressure sensation in right ear    Persistent headaches    Persistent cough for 3 weeks or longer    Otitis externa    Long term (current) use of antibiotics    Facial weakness    Primary hypertension    Class 1 obesity due to excess calories with serious comorbidity and body mass index (BMI) of 34.0 to 34.9 in adult    Accidental fall    Acute  thoracic back pain    Common migraine with intractable migraine    Erectile dysfunction    Exposure to severe acute respiratory syndrome coronavirus 2 (SARS-CoV-2)    Fatigue    History of acoustic neuroma    History of hearing loss    Infarction of testicle    Numbness and tingling sensation of skin    Pain of joint of both hands    Persistent pain in testicle    Spermatic cord inflammation    Weight gain    Encounter for weight loss counseling    Agatston coronary artery calcium score less than 100     Treatment Plan/Recommendations: can follow up in 4-6 weeks , can continue self care and wellness efforts and maintain routine health screenings can call  for treatment and scheduling concerns , Follow-up plan was discussed with patient.    Review with patient: Treatment plan reviewed with the patient.  Medication risks/benefit reviewed with the patient     HPI:  reviewed the notes in the [facility] emr from other medical providers    mood remains depressed with interference with day to day activities , isolates at times to himself , sometimes ruminates on conversations and social events when he wonders what others think of him   Everyday tasks are difficult   At night gets ringing in his ears , he goes over conversations in his mind repeatedly in a ruminative manner   has difficulty with word finding and speech , this is worse with anxiety   encouraged him to verbalize his thoughts and feelings   He identifies his wife as supportive  there is no indication of previous issues with mood cycling   no psychoses   he actively participated in discussion   affect is congruent , depressed   no panic attacks recently   no thoughts of harming himself or others   denies issues or history of substance abuse  is tolerating sertraline , has been able to try to push himself to do some more activities at times      there is no indication of previous issues with mood cycling   no psychoses   he actively participated in  discussion   affect is congruent  no panic attacks recently   no thoughts of harming himself or others   denies current issues or history of substance abuse    Medical History:  Past Medical History:   Diagnosis Date    Personal history of other diseases of the nervous system and sense organs     History of hearing loss    Personal history of other specified conditions     History of snoring    Unspecified nonsuppurative otitis media, right ear 10/21/2020    Fluid level behind tympanic membrane of right ear     Surgical History:  Past Surgical History:   Procedure Laterality Date    OTHER SURGICAL HISTORY  08/19/2020    Shoulder surgery    OTHER SURGICAL HISTORY  01/20/2022    Craniotomy     Family History:  No family history on file.  Social History:  Social History     Socioeconomic History    Marital status:      Spouse name: Not on file    Number of children: Not on file    Years of education: Not on file    Highest education level: Not on file   Occupational History    Not on file   Tobacco Use    Smoking status: Never    Smokeless tobacco: Never   Substance and Sexual Activity    Alcohol use: Yes    Drug use: Never    Sexual activity: Not on file   Other Topics Concern    Not on file   Social History Narrative    Not on file     Social Determinants of Health     Financial Resource Strain: Not on file   Food Insecurity: Not on file   Transportation Needs: Not on file   Physical Activity: Not on file   Stress: Not on file   Social Connections: Not on file   Intimate Partner Violence: Not on file   Housing Stability: Not on file     Record Review: brief     Vitals:  There were no vitals filed for this visit.    Padmini Gerard, KATHY-CNS

## 2024-02-28 ENCOUNTER — SOCIAL WORK (OUTPATIENT)
Dept: BEHAVIORAL HEALTH | Facility: CLINIC | Age: 59
End: 2024-02-28
Payer: COMMERCIAL

## 2024-02-28 DIAGNOSIS — F33.2 SEVERE EPISODE OF RECURRENT MAJOR DEPRESSIVE DISORDER, WITHOUT PSYCHOTIC FEATURES (MULTI): ICD-10-CM

## 2024-02-28 PROCEDURE — 90837 PSYTX W PT 60 MINUTES: CPT

## 2024-03-15 DIAGNOSIS — I10 PRIMARY HYPERTENSION: ICD-10-CM

## 2024-03-15 RX ORDER — VALSARTAN 80 MG/1
80 TABLET ORAL DAILY
Qty: 90 TABLET | Refills: 0 | Status: SHIPPED | OUTPATIENT
Start: 2024-03-15

## 2024-03-20 ENCOUNTER — SOCIAL WORK (OUTPATIENT)
Dept: BEHAVIORAL HEALTH | Facility: CLINIC | Age: 59
End: 2024-03-20
Payer: COMMERCIAL

## 2024-03-20 DIAGNOSIS — F33.2 SEVERE EPISODE OF RECURRENT MAJOR DEPRESSIVE DISORDER, WITHOUT PSYCHOTIC FEATURES (MULTI): ICD-10-CM

## 2024-03-20 PROCEDURE — 90837 PSYTX W PT 60 MINUTES: CPT

## 2024-03-21 NOTE — PROGRESS NOTES
Collaborative Care (CoCM)  Progress Note    Type of Interaction: In Office    Start Time: 1:00pm    End Time: 2:00pm    Patient states that things have been going okay the past several weeks. Patient's boss sent him a text message, he didn't respond. Patient wants to move on from his work. Patient speaking about how important his work was to him and being part of his identity/self-worth. Patient reports being frustrated that his health issues aren't improving. His hearing aids made background noise worse, he has to have them fixed. Encouraged patient to focus on activities that he can do and that he enjoys. Patient is looking forward to spending time outside. Patient to continue care with another provider, scheduling will reach out.     Appointment: Not Scheduled      Interventions Provided: Solution Focused Therapy      Progress Made: Moderate    Response to Intervention: Receptive         Plan:   Patient to continue care with another  provider           Follow Up / Next Appointment:  Patient will be contacted by scheduling

## 2024-03-25 ENCOUNTER — APPOINTMENT (OUTPATIENT)
Dept: PSYCHOLOGY | Facility: HOSPITAL | Age: 59
End: 2024-03-25
Payer: COMMERCIAL

## 2024-03-27 ENCOUNTER — OFFICE VISIT (OUTPATIENT)
Dept: NEUROLOGY | Facility: CLINIC | Age: 59
End: 2024-03-27
Payer: COMMERCIAL

## 2024-03-27 VITALS
HEART RATE: 90 BPM | SYSTOLIC BLOOD PRESSURE: 128 MMHG | DIASTOLIC BLOOD PRESSURE: 86 MMHG | WEIGHT: 261 LBS | BODY MASS INDEX: 33.5 KG/M2 | HEIGHT: 74 IN

## 2024-03-27 DIAGNOSIS — F09 COGNITIVE DYSFUNCTION: ICD-10-CM

## 2024-03-27 DIAGNOSIS — R26.89 IMBALANCE: ICD-10-CM

## 2024-03-27 DIAGNOSIS — R51.9 PERSISTENT HEADACHES: Primary | ICD-10-CM

## 2024-03-27 PROCEDURE — 99214 OFFICE O/P EST MOD 30 MIN: CPT | Performed by: PSYCHIATRY & NEUROLOGY

## 2024-03-27 PROCEDURE — 3008F BODY MASS INDEX DOCD: CPT | Performed by: PSYCHIATRY & NEUROLOGY

## 2024-03-27 PROCEDURE — 1036F TOBACCO NON-USER: CPT | Performed by: PSYCHIATRY & NEUROLOGY

## 2024-03-27 PROCEDURE — 3074F SYST BP LT 130 MM HG: CPT | Performed by: PSYCHIATRY & NEUROLOGY

## 2024-03-27 PROCEDURE — 3079F DIAST BP 80-89 MM HG: CPT | Performed by: PSYCHIATRY & NEUROLOGY

## 2024-03-27 RX ORDER — ACETAZOLAMIDE 125 MG/1
125 TABLET ORAL 2 TIMES DAILY
Qty: 60 TABLET | Refills: 2 | Status: SHIPPED | OUTPATIENT
Start: 2024-03-27 | End: 2024-05-09 | Stop reason: SDUPTHER

## 2024-03-27 NOTE — PROGRESS NOTES
Subjective     Andrey Drake is a 58 y.o. year old male seen in follow-up for persistent headache, imbalance and cognitive complaints status post acoustic neuroma surgery in January 2022 and subsequent CSF leak.    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     I evaluated him initially on 4/4/2022. As detailed in my ambulatory EMR note from that date, after presenting to ENT for hearing loss evaluation in August 2021 he was sent for IAC protocol MRI which demonstrated a 5 x 10 mm enhancing mass in the right IAC compatible with acoustic neuroma. He was seen by otology and neurosurgery and underwent translabyrinthine transmastoid approach resection on 1/10/2022.     Postoperative course was complicated by wound leakage concerning for CSF leak for which he presented to the ED on 1/25, after which he developed altered mental status, fevers and tachycardia. He was treated for bacterial and fungal meningitis. ID service was consulted.      Ever since then he has noted persistent headaches, somewhat moderated by the time he saw me but still on a daily pattern. He additionally noted persistent photophobia. He also has noted cognitive issues including reduction in verbal fluency. He has additionally been bothered by insomnia and fatigue.     I reviewed a contrasted brain MRI from 1/11/2022 showing status post right translabyrinthine tumor resection with small amount of residual enhancing tumor anteriorly. I reviewed a head CT from 2/15/2022 which appeared unremarkable.     I checked B12 and TSH levels which were normal. We discussed consideration of neuropsychological testing. I  considered a tricyclic antidepressant for his headaches and insomnia, but there was potential for interaction with fluconazole. Accordingly I recommended cyproheptadine.     I evaluated him again on 7/19/2022, by A/V visit. At that time he was taking cyproheptadine with some improvement noted in headaches but significant weight gain. I advised stopping cyproheptadine. I advised increasing verapamil from 80 mg twice daily to 80 mg a.m. and 160 mg p.m.     I evaluated him again on 8/16/2022, again by A/V visit. He remained in a daily headache pattern and endorsed persistent photophobia. He noted mild improvement with verapamil titration and I recommended increasing to 160 mg twice daily but first checking an EKG. He continued in vestibular therapy and continued to feel somewhat off balance. I reviewed the disability form with him.     I evaluated him again on 1/6/2023 in the office. He remained in a daily headache pattern at that time. He had noted only modest improvement in headaches with verapamil titration but we discussed further titration to 160 mg twice daily after checking an EKG. I was hesitant to recommend topiramate given his speech/cognitive issues. I referred him to [facility] for a headache subspecialty opinion and further management.    I evaluated him most recently on 9/11/2023 at which visit he indicated being started on Ajovy and subsequent improvement in headache pattern.  I recommended he continue Ajovy and verapamil.    Given that he was still noting headaches once or twice a week and ongoing sensitivity to lights and noises as well as nausea, I recommended adding riboflavin 400 mg daily as a nutraceutical.    I recommended proceeding with formal neuropsychological testing given his ongoing cognitive concerns.    He is evaluated again today in the office, again accompanied by his wife.    He indicates that he was rescheduled twice by neuropsychology and still has not received an appointment for  evaluation.    His wife indicates that his hesitant speech has improved to a modest extent but he does still intermittently have speech hesitancy and some word finding difficulty.  She has also noted, as is evident early on in the visit today, that he has a tendency to lauren his head around when speaking.    He remains in a daily headache pattern.  He lapsed off Ajovy due to cost, as it is not covered by his current insurance.  His new insurance which takes effect in May will apparently cover Aimovig but not Ajovy.  In this context, in addition to his low level daily headache over the past month he has had on average 3 days/week when there is debilitating headache that keeps him from functioning.  He continues on verapamil 160 mg twice daily.  He has tried riboflavin but has not found it effective.    He is very sensitive to visual stimuli.  This includes particularly looking at items at the grocery store (his wife indicates that he keeps his head down and just looks at the shopping cart), but also watching TV.  Visual stimuli make him feel unwell and nauseous, not specifically vertiginous.  He is also very motion sensitive.  He is not driving.    He remains photophobic but indicates that that aspect of things has improved considerably.    He is walking with a single-point cane and indicates using it consistently.  He indicates occasionally stumbling but has not had completed falls recently.  A physical therapist is coming to his house every 2 or 3 weeks and he finds this of some benefit.    His wife has noted recently that his laugh has changed, being more high-pitched.  Whether related to this or not, he indicates his sertraline dose was increased.    Review of Systems    As per the history of present illness    Patient Active Problem List   Diagnosis    Major depression    Depression    Crocodile tears syndrome    Cognitive dysfunction    Chronic tonsillitis    Chronic otitis media with effusion    Chronic  mastoiditis of right side    Attention deficit disorder    Asymmetric tonsils    Left-sided sensorineural hearing loss    Asymmetric SNHL (sensorineural hearing loss)    Anxiety reaction    Acoustic neuroma (CMS/HCC)    Other long term (current) drug therapy    Nausea and/or vomiting    Mixed hearing loss of right ear    Meningitis due to gram-negative bacteria    Meningitis    Lumbago    Insomnia    Infection associated with neurological device (CMS/HCC)    Imbalance    Hypokalemia    Fungal meningitis    Expressive aphasia    Dryness of right eye due to decreased tear production    Vertigo    Dizziness    Other cranial cerebrospinal fluid leak    Hyperlipidemia    Encounter for adjustment and management of vascular access device    Other abnormalities of gait and mobility    Oth bacterial agents as the cause of diseases classd elswhr    Unsteady gait    Tinnitus, right    Thrush, oral    Sensorineural hearing loss (SNHL) of left ear with restricted hearing of right ear    Rhinorrhea    Retraction of tympanic membrane of left ear    Pressure sensation in right ear    Persistent headaches    Persistent cough for 3 weeks or longer    Otitis externa    Long term (current) use of antibiotics    Facial weakness    Primary hypertension    Class 1 obesity due to excess calories with serious comorbidity and body mass index (BMI) of 34.0 to 34.9 in adult    Accidental fall    Acute thoracic back pain    Common migraine with intractable migraine    Erectile dysfunction    Exposure to severe acute respiratory syndrome coronavirus 2 (SARS-CoV-2)    Fatigue    History of acoustic neuroma    History of hearing loss    Infarction of testicle    Numbness and tingling sensation of skin    Pain of joint of both hands    Persistent pain in testicle    Spermatic cord inflammation    Weight gain    Encounter for weight loss counseling    Agatston coronary artery calcium score less than 100     Past Medical History:   Diagnosis Date     Personal history of other diseases of the nervous system and sense organs     History of hearing loss    Personal history of other specified conditions     History of snoring    Unspecified nonsuppurative otitis media, right ear 10/21/2020    Fluid level behind tympanic membrane of right ear     Past Surgical History:   Procedure Laterality Date    OTHER SURGICAL HISTORY  08/19/2020    Shoulder surgery    OTHER SURGICAL HISTORY  01/20/2022    Craniotomy     Social History     Tobacco Use    Smoking status: Never    Smokeless tobacco: Never   Substance Use Topics    Alcohol use: Yes     family history is not on file.    Current Outpatient Medications:     acetaminophen (TylenoL) 325 mg capsule, Take by mouth., Disp: , Rfl:     cyproheptadine (Periactin) 4 mg tablet, PLEASE SEE ATTACHED FOR DETAILED DIRECTIONS, Disp: , Rfl:     fremanezumab (Ajovy) 225 mg/1.5 mL auto-injector, INJECT 225 MG (1 PEN) UNDER THE SKIN ONCE A MONTH AS DIRECTED., Disp: 1.5 mL, Rfl: 11    ipratropium (Atrovent) 21 mcg (0.03 %) nasal spray, SPRAY 2 SPRAYS INTO EACH NOSTRIL 3 TIMES A DAY AS NEEDED, Disp: , Rfl:     LORazepam (Ativan) 0.5 mg tablet, Take by mouth., Disp: , Rfl:     methocarbamol (Robaxin) 500 mg tablet, 2 tablet EVERY 8 HOURS (route: oral), Disp: , Rfl:     ondansetron (Zofran) 4 mg tablet, 1 tablet EVERY 8 HOURS (route: oral), Disp: , Rfl:     rizatriptan (Maxalt) 10 mg tablet, TAKE 1 TABLET AT ONSET OF HEADACHE MAY REPEAT EVERY 2 HOURS AS NEEDED. MAX 3 TABS/24 HRS, Disp: , Rfl:     rosuvastatin (Crestor) 5 mg tablet, Take 1 tablet (5 mg) by mouth once daily., Disp: 90 tablet, Rfl: 3    semaglutide, weight loss, (Wegovy) 0.25 mg/0.5 mL pen injector, Inject 0.25 mg under the skin 1 (one) time per week for 4 doses., Disp: 2 mL, Rfl: 0    sertraline (Zoloft) 100 mg tablet, 1 and 1/2 tablets each morning and 1/2 tablet each evening, Disp: 180 tablet, Rfl: 1    sodium chloride 0.9% (Normal Saline Flush) flush, 10 mL EVERY 8 HOURS  (route: injection), Disp: , Rfl:     SUMAtriptan (Imitrex) 50 mg tablet, TAKE 1 TABLET FOR HEADACHE RELIEF. MAY REPEAT EVERY 2 HOURS. MAX 200MG/DAY., Disp: , Rfl:     tetrahydrozoline-zinc (Visine-AC) 0.05-0.25 % ophthalmic solution, Administer into affected eye(s) twice a day., Disp: , Rfl:     traZODone (Desyrel) 50 mg tablet, Take 1 tablet (50 mg) by mouth once daily at bedtime., Disp: 30 tablet, Rfl: 1    valsartan (Diovan) 80 mg tablet, TAKE 1 TABLET BY MOUTH ONCE DAILY., Disp: 90 tablet, Rfl: 0    verapamil (Calan) 80 mg tablet, INCREASE AS DIRECTED TO 2 TABLETS TWICE A DAY, Disp: 360 tablet, Rfl: 1    Vitamin B-2 100 mg tablet tablet, Take 2 tablets (200 mg) by mouth 2 times a day., Disp: , Rfl:     white petrolatum-mineral oiL 94-3 % ophthalmic ointment, Apply to affected eye(s)., Disp: , Rfl:   Allergies   Allergen Reactions    Other Other       Objective   Neurological Exam  Physical Exam    Physical Examination:    General: Alert man who was ambulatory with single-point cane.    Mental Status: Clear sensorium without fluctuation.  Appropriate in conversation.  Variable degree of hesitancy getting his words out but intact language without paraphasic errors.  Oriented to self, month, year, not the exact date.  He was able to give the day of the week.  Oriented to [place], not suburb or street.  Oriented to the correct floor of the building.  He followed instructions accurately and promptly on exam.    Cranial Nerves:  Funduscopic exam was not well visualized bilaterally on nondilated exam.  Pupils were equal, round and reactive to light with no relative afferent pupillary defect.  Extraocular movements were intact and conjugate without nystagmus.  No ptosis.  Visual fields were full to confrontation tested binocularly.  Facial sensation was symmetric to pin.  Facial motor function was symmetrically intact.  Hearing was grossly intact.  No dysarthria.  Shoulder shrug was symmetric.  Tongue protrusion was  midline.    Coordination: Finger to finger was accurate bilaterally without dysmetria or intention tremor.  No postural or rest tremor or myoclonus.  As above, during the early part of the visit when talking he exhibited a pronounced tendency to move the head and upper torso in choreiform fashion.  This was much less evident later in the visit.  No choreiform movements of the upper extremities were noted.    Sensation: Romberg sign was present, backwards.    Station: Intact and stable.    Gait: Stable observed without his cane and notable for a highly variable base width ranging between normal and moderately broad, arrhythmic maritza, no apraxia.  He moved at a moderate pace.    Assessment/Plan     He remains complex.    His headache pattern has worsened since lapsing off Ajovy due to cost considerations.  He anticipates being able to start Aimovig in a couple of months after starting a new insurance.  At this point verapamil and riboflavin alone are not sufficient for headache control.    He additionally endorses a high degree of sensitivity to visual stimuli, which provokes nausea and discomfort.    Because it may help with headaches and with sensitivity to visual stimuli I suggested adding acetazolamide 125 mg twice daily.  I reviewed side effects with him and ascertained that he does not have any history of kidney stones.  I reviewed CMP and CBC from September 2023 which were normal.  I did not find interactions of concern on reviewing acetazolamide against his medication regimen on Micromedex.     I advised him to contact the office after 4 weeks on the initial dose of acetazolamide to decide about titration.  If he has trouble tolerating it he is to contact the office sooner.    Once he is on his new insurance in May if he is still significantly bothered by headache I advised him to contact the office about starting on Aimovig.    I am providing another referral to neuropsychology.  It would be helpful to get  this evaluation.    I am not sure of the basis of his head movements while speaking.  He does not otherwise appear choreiform on evaluation to suggest Salinas's or similar disorder.  This may be an anxiety phenomenon.  However, we will see whether there are features to raise concern for HD on neuropsychology assessment.    We will determine timing of his next office visit after neuropsychology assessment is completed.

## 2024-03-27 NOTE — PATIENT INSTRUCTIONS
We discussed that your headaches have worsened and Essence is no longer covered by insurance.    Given your worsened headaches and your sensitivity to visual stimuli, I think you might benefit from a medication called acetazolamide.  We discussed this medication and reviewed side effect profile.  I recommend trying a very low starting dose of 125 mg twice a day.    If you have any trouble tolerating acetazolamide, simply stop it and contact my office.  Otherwise contact the office after about 4 weeks on it to let me know how you are doing.    Once you are on your new insurance in May, if you are still noting worsened headaches let the office know and I can send in an order for Aimovig.    I am placing another referral to neuropsychology for cognitive testing.    We will decide on timing of your next office visit after the neuropsychology evaluation has been completed.

## 2024-03-29 ENCOUNTER — OFFICE VISIT (OUTPATIENT)
Dept: PRIMARY CARE | Facility: CLINIC | Age: 59
End: 2024-03-29
Payer: COMMERCIAL

## 2024-03-29 VITALS
DIASTOLIC BLOOD PRESSURE: 80 MMHG | HEIGHT: 74 IN | BODY MASS INDEX: 33.37 KG/M2 | WEIGHT: 260 LBS | SYSTOLIC BLOOD PRESSURE: 115 MMHG | HEART RATE: 78 BPM

## 2024-03-29 DIAGNOSIS — R51.9 PERSISTENT HEADACHES: Primary | ICD-10-CM

## 2024-03-29 PROCEDURE — 3074F SYST BP LT 130 MM HG: CPT | Performed by: INTERNAL MEDICINE

## 2024-03-29 PROCEDURE — 3079F DIAST BP 80-89 MM HG: CPT | Performed by: INTERNAL MEDICINE

## 2024-03-29 PROCEDURE — 3008F BODY MASS INDEX DOCD: CPT | Performed by: INTERNAL MEDICINE

## 2024-03-29 PROCEDURE — 99213 OFFICE O/P EST LOW 20 MIN: CPT | Performed by: INTERNAL MEDICINE

## 2024-03-29 ASSESSMENT — LIFESTYLE VARIABLES
SKIP TO QUESTIONS 9-10: 1
AUDIT-C TOTAL SCORE: 1
AUDIT TOTAL SCORE: 1
HAVE YOU OR SOMEONE ELSE BEEN INJURED AS A RESULT OF YOUR DRINKING: NO
HOW OFTEN DO YOU HAVE A DRINK CONTAINING ALCOHOL: MONTHLY OR LESS
HOW OFTEN DO YOU HAVE SIX OR MORE DRINKS ON ONE OCCASION: NEVER
HOW MANY STANDARD DRINKS CONTAINING ALCOHOL DO YOU HAVE ON A TYPICAL DAY: 1 OR 2
HAS A RELATIVE, FRIEND, DOCTOR, OR ANOTHER HEALTH PROFESSIONAL EXPRESSED CONCERN ABOUT YOUR DRINKING OR SUGGESTED YOU CUT DOWN: NO

## 2024-03-29 ASSESSMENT — PATIENT HEALTH QUESTIONNAIRE - PHQ9
1. LITTLE INTEREST OR PLEASURE IN DOING THINGS: NOT AT ALL
SUM OF ALL RESPONSES TO PHQ9 QUESTIONS 1 AND 2: 2
10. IF YOU CHECKED OFF ANY PROBLEMS, HOW DIFFICULT HAVE THESE PROBLEMS MADE IT FOR YOU TO DO YOUR WORK, TAKE CARE OF THINGS AT HOME, OR GET ALONG WITH OTHER PEOPLE: VERY DIFFICULT
2. FEELING DOWN, DEPRESSED OR HOPELESS: MORE THAN HALF THE DAYS

## 2024-03-29 NOTE — PROGRESS NOTES
"Subjective   Patient ID: Andrey Drake is a 58 y.o. male who presents for Med Refill.    HPI Patient has been on the 0.6mg dose of semaglutide for one month and tolerating it well. Patient would like to increase dose for further weight loss.     Review of Systems    Objective   /80   Pulse 78   Ht 1.88 m (6' 2\")   Wt 118 kg (260 lb)   BMI 33.38 kg/m²     Physical Exam    Assessment/Plan   Problem List Items Addressed This Visit             ICD-10-CM    Persistent headaches - Primary R51.9    Relevant Medications    ubrogepant (Ubrelvy) 100 mg tablet tablet    Other Relevant Orders    Referral to Neurology          "

## 2024-04-06 DIAGNOSIS — R51.9 HEADACHE, UNSPECIFIED: ICD-10-CM

## 2024-04-08 RX ORDER — VERAPAMIL HYDROCHLORIDE 80 MG/1
TABLET ORAL
Qty: 360 TABLET | Refills: 1 | Status: SHIPPED | OUTPATIENT
Start: 2024-04-08

## 2024-04-09 ENCOUNTER — APPOINTMENT (OUTPATIENT)
Dept: BEHAVIORAL HEALTH | Facility: CLINIC | Age: 59
End: 2024-04-09
Payer: COMMERCIAL

## 2024-04-20 DIAGNOSIS — R51.9 PERSISTENT HEADACHES: ICD-10-CM

## 2024-05-09 RX ORDER — ACETAZOLAMIDE 125 MG/1
125 TABLET ORAL 2 TIMES DAILY
Qty: 180 TABLET | Refills: 1 | Status: SHIPPED | OUTPATIENT
Start: 2024-05-09 | End: 2024-11-05

## 2024-05-22 ENCOUNTER — TELEMEDICINE (OUTPATIENT)
Dept: BEHAVIORAL HEALTH | Facility: CLINIC | Age: 59
End: 2024-05-22
Payer: COMMERCIAL

## 2024-05-22 DIAGNOSIS — R51.9 PERSISTENT HEADACHES: ICD-10-CM

## 2024-05-22 DIAGNOSIS — G47.00 INSOMNIA, UNSPECIFIED TYPE: ICD-10-CM

## 2024-05-22 DIAGNOSIS — F33.1 MODERATE EPISODE OF RECURRENT MAJOR DEPRESSIVE DISORDER (MULTI): ICD-10-CM

## 2024-05-22 DIAGNOSIS — F41.1 GAD (GENERALIZED ANXIETY DISORDER): ICD-10-CM

## 2024-05-22 PROCEDURE — 3008F BODY MASS INDEX DOCD: CPT | Performed by: CLINICAL NURSE SPECIALIST

## 2024-05-22 PROCEDURE — 99215 OFFICE O/P EST HI 40 MIN: CPT | Performed by: CLINICAL NURSE SPECIALIST

## 2024-05-22 RX ORDER — SERTRALINE HYDROCHLORIDE 100 MG/1
TABLET, FILM COATED ORAL
Qty: 180 TABLET | Refills: 1 | Status: SHIPPED | OUTPATIENT
Start: 2024-05-22

## 2024-05-22 RX ORDER — TRAZODONE HYDROCHLORIDE 50 MG/1
50 TABLET ORAL NIGHTLY
Qty: 90 TABLET | Refills: 0 | Status: SHIPPED | OUTPATIENT
Start: 2024-05-22 | End: 2024-08-20

## 2024-05-22 NOTE — PROGRESS NOTES
Outpatient Psychiatry      Subjective   Andrey Drake, a 59 y.o. male, presents as an established patient for an outpatient appointment /medication management in psychiatry       Diagnosis: ·      RONDA (generalized anxiety disorder) (300.02) (F41.1)   · Insomnia unspecified type    · Depression, major moderate ( primary diagnoses)    Treatment Plan/Recommendations:   Follow-up plan  was discussed with patient.  can follow up in 4-6 weeks , can continue self care and wellness efforts and maintain routine health screenings can call  for treatment and scheduling concerns     HPI:  reviewed the notes in the [facility] emr from other medical providers    mood remains depressed with interference with day to day activities , isolates at times to himself , sometimes ruminates on conversations and social events when he wonders what others think of him   Everyday tasks are difficult   At night gets ringing in his ears , he goes over conversations in his mind repeatedly in a ruminative manner   has difficulty with word finding and speech , this is worse with anxiety, he will be starting speech therapy soon   encouraged him to verbalize his thoughts and feelings   He identifies his wife as supportive  there is no indication of issues with mood cycling   no psychoses   he actively participated in discussion   affect is congruent , depressed   no panic attacks recently   no thoughts of harming himself or others   denies issues or history of substance abuse  is tolerating sertraline , has been able to try to push himself to do some more activities at times  no psychoses     Current Outpatient Medications:     acetaminophen (TylenoL) 325 mg capsule, Take by mouth., Disp: , Rfl:     acetaZOLAMIDE (Diamox) 125 mg tablet, TAKE 1 TABLET (125 MG) BY MOUTH 2 TIMES A DAY., Disp: 180 tablet, Rfl: 1    ipratropium (Atrovent) 21 mcg (0.03 %) nasal spray, SPRAY 2 SPRAYS INTO EACH NOSTRIL 3 TIMES A DAY AS NEEDED, Disp: , Rfl:      LORazepam (Ativan) 0.5 mg tablet, Take by mouth., Disp: , Rfl:     methocarbamol (Robaxin) 500 mg tablet, 2 tablet EVERY 8 HOURS (route: oral), Disp: , Rfl:     ondansetron (Zofran) 4 mg tablet, 1 tablet EVERY 8 HOURS (route: oral), Disp: , Rfl:     rizatriptan (Maxalt) 10 mg tablet, TAKE 1 TABLET AT ONSET OF HEADACHE MAY REPEAT EVERY 2 HOURS AS NEEDED. MAX 3 TABS/24 HRS, Disp: , Rfl:     rosuvastatin (Crestor) 5 mg tablet, Take 1 tablet (5 mg) by mouth once daily., Disp: 90 tablet, Rfl: 3    semaglutide, weight loss, (Wegovy) 0.25 mg/0.5 mL pen injector, Inject 0.25 mg under the skin 1 (one) time per week for 4 doses., Disp: 2 mL, Rfl: 0    sertraline (Zoloft) 100 mg tablet, 1 and 1/2 tablets each morning and 1/2 tablet each evening, Disp: 180 tablet, Rfl: 1    sodium chloride 0.9% (Normal Saline Flush) flush, 10 mL EVERY 8 HOURS (route: injection), Disp: , Rfl:     SUMAtriptan (Imitrex) 50 mg tablet, TAKE 1 TABLET FOR HEADACHE RELIEF. MAY REPEAT EVERY 2 HOURS. MAX 200MG/DAY., Disp: , Rfl:     tetrahydrozoline-zinc (Visine-AC) 0.05-0.25 % ophthalmic solution, Administer into affected eye(s) twice a day., Disp: , Rfl:     traZODone (Desyrel) 50 mg tablet, Take 1 tablet (50 mg) by mouth once daily at bedtime., Disp: 30 tablet, Rfl: 1    ubrogepant (Ubrelvy) 100 mg tablet tablet, Take 1 tablet (100 mg) by mouth 1 time if needed (headache) for up to 2 doses., Disp: 2 tablet, Rfl: 0    valsartan (Diovan) 80 mg tablet, TAKE 1 TABLET BY MOUTH ONCE DAILY., Disp: 90 tablet, Rfl: 0    verapamil (Calan) 80 mg tablet, TAKE 2 TABLETS BY MOUTH TWICE A DAY AS DIRECTED, Disp: 360 tablet, Rfl: 1    Vitamin B-2 100 mg tablet tablet, Take 2 tablets (200 mg) by mouth 2 times a day., Disp: , Rfl:     white petrolatum-mineral oiL 94-3 % ophthalmic ointment, Apply to affected eye(s)., Disp: , Rfl:     Record Review: brief       Vitals: this is a virtual appointment   There were no vitals filed for this visit.    Padmini Gerard,  APRN-CNS

## 2024-05-30 ENCOUNTER — OFFICE VISIT (OUTPATIENT)
Dept: PSYCHOLOGY | Facility: CLINIC | Age: 59
End: 2024-05-30
Payer: MEDICARE

## 2024-05-30 DIAGNOSIS — F32.A DEPRESSION, UNSPECIFIED DEPRESSION TYPE: Primary | ICD-10-CM

## 2024-05-30 DIAGNOSIS — F41.9 ANXIETY: ICD-10-CM

## 2024-05-30 DIAGNOSIS — F09 COGNITIVE DYSFUNCTION: ICD-10-CM

## 2024-05-30 PROCEDURE — 99211NT NEUROPYSCH TESTING PENDING FINAL BILLING: Performed by: PSYCHOLOGIST

## 2024-05-30 NOTE — PROGRESS NOTES
Neuropsychology Evaluation    Name: Andrey Drake  : 1965  MRN: [id number]  Referring Provider: Jake Jorge MD    Date of Service: 2024      Reason for Visit: Mr. 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     Summary/Impressions: The evaluation as complicated, somewhat, by evidence of variable performance validity, suggesting a degree of interference from non-neurological (e.g., emotional, behavioral, motivational) factors on test performance/task engagement. This was not intentional, perse; nevertheless, present results are best interpreted as a conservative estimate of Mr. Drake's cognitive abilities.     DIAGNOSTIC IMPRESSIONS: Mild Cognitive Impairment, with predominant deficits in the domain of executive functioning, including the executive aspects of language (e.g., retrieval) and learning/memory.     Etiology is unclear, as fluctuating performance validity on neuropsychological measures limits our capacity to interpret a meaningful pattern of performances suggestive of an underlying condition. That is, we are largely unable to determine the degree to which low scores reflect genuine, organically based impairment or interference from non-neurological factors. Given Mr. Drake's neurological history, cognitive difficulties may reflect (to some degree) residua of acoustic neuroma resection and subsequent complications. There may also be vascular contributions to neurological change. At face value, significant verbal < visuospatial discrepancy in learning/memory suggests a degree of left-hemisphere dysfunction, though the cognitive profile was otherwise not particularly notable for lateralizing findings. The overall profile is not particularly consistent  with a primary memory-based neurodegenerative process (e.g., Alzheimer's disease).    Though anxiety/psychiatric symptoms clearly interfered with some performances on testing, these factors do not fully explain the severity of cognitive deficits and functional difficulties; rather, the clinical presentation suggests that some degree of Mr. Drake's anxiety may be a neuropsychiatric manifestation of underlying neurological change. For example, executive dysfunction can also be associated with the diminished stress/frustration tolerance noted on evaluation, which seemed to interfere with the patient's engagement/performance.    In Mr. Drake's daily life, stress, depression, anxiety, experience of pain/somatic discomfort (e.g., photophobia, phonophobia), non-restorative sleep, and daytime fatigue are likely contributing to his experience of cognitive difficulty. Even minimally, these factors may intermittently reduce cognitive effectiveness, typically by depleting cognitive/mental resources and interfering with normal attention and efficiency of information processing (e.g., via intrusive/ruminative thoughts). With attention disturbance, memories are poor due to a failure to encode information versus a loss of learned material. Stress has also been shown to impair the retrieval of stored information. Psychological/constitutional/situational factors can also compromise other abilities (e.g., performing complex detail-oriented tasks, attending to multiple tasks at once, acquiring new information, and organizing thoughts/speech) and hinder the ability to adapt to, or develop compensatory strategies for, any cognitive changes that may be present.     Ongoing neurological workup, monitoring, and follow-up care are recommended for optimal clarification of diagnosis/etiology and treatment course.    RECOMMENDATIONS & CONSIDERATIONS:    1.   Given the nature of present findings, ongoing monitoring and follow-up care with   Luis[facility] would be prudent.     Mr. Drake might benefit from a cholinesterase inhibitor (e.g., donepezil, galantamine, rivastigmine) or N-methyl D-aspartate (NMDA) antagonist (e.g., memantine) to help preserve cognitive functioning, if not medically contraindicated.     2.   The severity of Mr. Drake's reported depression, malaise, anxiety, and stress reaction suggests that psychological symptoms are not optimally managed at present; targeting these factors would be prudent.    Ongoing psychiatric medication management with JEREMIAS Pimentel is recommended. Mr. Drake might benefit particularly from consultation with a psychiatrist familiar with the neurocognitive consequences of stress/depression/anxiety in the context of a concurrent neurological process, as well as the management of neuropsychiatric symptoms. Dr. Mona Villarreal, Dr. Suzanne Rodriguez, Dr. Crow Whyte, Dr. Perlita Rudolph, or one of their colleagues in Psychiatry ([phone]) can assist with optimizing pharmacological management of emotional factors that might be exacerbating cognitive difficulties and/or otherwise interfering with everyday functioning. [place], Dr. David Orosco (/[facility] [place]; [phone]) and Dr. Rodri Slaughter MD ([phone]) are both highly recommended.     Mr. Drake would likely also benefit from individual psychotherapy / adjustment counseling, particularly brief structured cognitive behavioral therapy (CBT) or acceptance and commitment therapy (ACT), to learn effective coping skills and strategies for managing stress, mood, and physical symptoms that interfere with his daily functioning. Given his emphasis on somatic and cognitive symptoms, adjustment counseling with a health psychologist might be particularly beneficial. Health psychologists specialize in the interaction between physical wellbeing and mental health, and may be particularly well-equipped to assist Mr. Drake in learning  adaptive strategies of coping with physical concerns and other life stressors that can affect coping ability. The [facility] ([phone]) on the /[facility] in [place] offers comprehensive services on a sliding scale fee. Providers within the [facility] mental health system that offer public-supported services near Mr. Drake's home can be identified at the web site for the [facility]:  http://a.[place].[facility]/.    3.   If symptoms persist/worsen after better management of psychological symptoms, Mr. Drake may benefit from consultation with Dr. Nichelle Zavaleta in our Neuropsychological Rehabilitation program ([phone]), to facilitate the development of compensatory strategies and assist with psychological/functional adjustment to cognitive/neurological changes.     4.   Restorative sleep (7-9 hours/night recommended for adults) is critical for daytime alertness/safety, cognitive effectiveness, and mood, as well as physical, neurological, and mental health more broadly. Mr. Drake described a degree of sleep disturbance and daytime fatigue; as such, he might benefit from consultation with a sleep specialist and/or formal sleep study to investigate and address potential variables that may be interfering with sleep.    In general, the following behavioral strategies and environmental modifications can help optimize sleep duration and quality:            a.  Set a fixed bedtime and waking time every day.            b.  Avoid napping during the day.             c.  Avoid alcohol, caffeine and heavy, spicy, or sugary foods 4-6 hours before bedtime.             d.  Exercise regularly, but not right before going to bed.            e.  Block out all distractions by turning off - or even removing from the room - electronic devices such as TV, computer, phone, video player, audio player, caty device, etc.            f.  Use comfortable bedding, set a comfortable  temperature, and keep the room well ventilated.            g.  Do not use the bed as an office, workroom, or recreation room.             h.  Establish a pre-sleep ritual, such as a warm bath or a few minutes of reading.            i.  If prone to anxiety, relaxation techniques such as yoga and deep breathing can be helpful.     5.   In general, information is more likely to be reliably remembered when efforts are made to explicitly enhance initial learning, such as by rehearsing, paraphrasing, and restating information; and by identifying links between to-be learned information and prior knowledge.     Structural and organizational reinforcements are often helpful for individuals with attention/memory complaints and executive difficulties. As such, Mr. Drake may benefit from the following strategies:            a.  Following a routine and consistent daily schedule.             b.  Using a single planner, calendar, or electronic organizer to plan and manage appointment dates, activities, and tasks.            c.  Working on one task at a time until it is completed.            d.  Keeping a single list of tasks, listed by priority, and marking them when complete.            e.  Placing reminders in places where they are easily noticed (e.g., a note on door).            f.   Setting reminder alarms for important tasks and events (e.g., medications, appointments) using a mobile device (e.g., smartphone, iPad), computer calendar, or  (e.g., Phuong Moore).             g.  Writing down important information (e.g., tasks, conversational details, list items) immediately, to strengthen encoding and for later reference. This will also help to free up cognitive resources to focus on the task at hand.            h.  Associating new information with older, previously stored information (e.g., familiar categories, anecdotes, references, reminders).            i.  Using recognition cue cards in everyday memory  situations that come up with frequency, such as lists of routine household tasks, errands, or grocery items to prompt recall.            j.   Having specific locations for frequently used items (e.g., keys, wallet, phone).            k.  Using an activity notebook or journal throughout the day to record activities or information that people share with Mr. Drake.    6.   Mr. Drake is encouraged to remain in close contact with health professionals regarding his cerebrovascular risk factors (e.g., hypertension, hyperlipidemia) and any other medical conditions may affect current and future brain function.    The following activities and interventions are recommended for optimizing brain health and preserving cognitive function:            a.  “heart-healthy”/cardiovascular diet;            b.  good stress management (e.g., relaxation techniques);            c.  management of any vascular risks (e.g., hypertension, cholesterol);            d.  30-60 minutes of daily aerobic exercise (e.g., walking, swimming);            e.  social engagement (e.g., getting together with other people);             f.  adequate sleep; and             g.  cognitively stimulating activities (e.g., classes to learn new things, challenging puzzles).      Leading a physically, socially, and cognitively active lifestyle is important for healthy brain aging. Within the bounds of safety, good judgment, and medical advice, this includes engaging in regular exercise (e.g., walking, swimming, yoga); Mr. Drake would likely benefit from involvement in supervised physical activity (e.g., going to the gym with a friend/family member). As much as possible, given health/safety considerations, he is also encouraged to maintain a socially active lifestyle, as a means of promoting cognitive stimulation and emotional benefits that will optimize quality of life. He is likely to benefit from exploring/pursue new hobbies that allow for a balance of enjoyment,  "independence, and safety.    7.   A planned re-evaluation does not appear necessary at this time. If concerns persist or worsen after psychological factors are adequately managed, repeat neuropsychological assessment (as clinically indicated; no sooner than 1-2 years), using present data as baseline, may be helpful in determining if there is further cognitive decline and to characterize Mr. Drake's needs at that time.      Thank you for the opportunity to participate in Mr. Drake's evaluation and care. If I can be of further assistance, please do not hesitate to contact me at [phone].      -- EXTENDED REPORT --      History of Presenting Illness: Mr. Drake is a 59 year-old, left-handed,  male, followed by Dr. 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     RELEVANT LABS/EXAMS:      MRI of the IAC (w/ and w/o IV contrast) from 9/27/2021 was interpreted by the radiologist as showing \"acoustic schwannoma on the right side\" (i.e., \"[...] an enhancing mass in the right internal auditory canal that measures 5 mm x 10 mm in size,\" with \"an additional component in the right cerebellopontine angle cistern which measures approximately 1 cm in size\" and \"abnormal enhancement within the basal turn of the right cochlea suggesting intra cochlear extension\"). There were also \"scattered foci of increased signal in the subcortical and periventricular white matter,\" thought to \"likely represent foci of demyelination of uncertain cause and significance and may be physiologic at this patient's stated age.\"    Head CT (w/o IV contrast) from 1/25/2022 was interpreted by the radiologist as follows:   - There is no " evidence of hyperdense intracranial hemorrhage. No midline shift.  - Gray-white differentiation is intact, without evidence of large vascular territory infarct.  - Ventricular system is nondilated. Basal cisterns are patent.  - There is again evidence of a low-density extra-axial fluid collection along the right cerebellar hemisphere which measures up to 0.8 cm in greatest axial width, slightly increased compared to prior postoperative CT head 01/10/2022 (series 207, image 79). There is mild mass effect on the adjacent right cerebellar hemisphere, without significant effacement of the 4th ventricle.  - Previously seen intracranial pneumocephalus has completely resolved. The sigmoid and transverse sinuses are symmetric in appearance bilaterally.    Brain MRI (w/ and w/o IV contrast) from 1/27/2022 was interpreted by the radiologist as showing fluid collection within the subcutaneous soft tissues overlying the mastoidectomy site with layering diffusion restriction, which could be due to CSF leak or seroma with superimposed infectious inflammatory change not excluded; otherwise stable examination  without evidence of acute intracranial pathology.    MRI of the brain/IAC (w/ and w/o IV contrast) from 12/12/2022 was interpreted by the radiologist as showing evolving changes of right translabyrinthine mass resection with decreased size of the residual enhancing soft tissue anteriorly, with no evidence of progressive disease. Additional findings: No acute intracranial hemorrhage. No new extra-axial collection. Ventricular size is within normal limits. No midline shift or herniation. The basal cisterns are patent. No parenchymal restricted diffusion to suggest acute infarction. A few nonspecific T2 hyperintense foci in the white matter are similar to previous and may be secondary to chronic small vessel ischemic disease. No abnormal parenchymal enhancement. The left 7th/8th nerve complex and inner ear structures are  "unremarkable. Small left mastoid effusion.    MRI of the brain/IAC (w/ and w/o IV contrast) from 11/8/2023 was interpreted by the radiologist as showing \"stable size and appearance of residual enhancing soft tissue within the IAC measuring 1.3 x 0.4 cm, which may represent granulation tissue versus residual disease\" and \"mild periventricular and subcortical white matter T2 FLAIR hyperintensities, nonspecific, but likely representing chronic microangiopathic changes.\" There was no acute infarction, intracranial hemorrhage, intraparenchymal mass lesion, abnormal intraparenchymal enhancement, hydrocephalus, or extra-axial fluid collections.     CLINICAL INTERVIEW: Mr. Drake described persistent headaches, photophobia, phonophobia, and word-finding difficulty, which began in the context of his post-surgical complications (i.e., CSF leak, meningitis infection). He has had physical and vestibular therapies but denied receiving occupational or speech therapies (he expressed interest in speech therapy). The patient also acknowledged increased difficulty with name retrieval, attention/concentration, distractibility, multitasking (e.g., more prone to leaving tasks unfinished), misplacing items/belongings, and forgetfulness (e.g., for conversational details; with benefit from reminder cues), as well as slowed processing speed. Hearing difficulty and headaches can interfere with his ability to process/comprehend conversations. Word-finding difficulty was exacerbated by topiramate but has otherwise improved gradually over time. More generally, cognitive status seems to fluctuate (e.g., depending on sleep quality/quantity and \"how I'm feeling\").     Mr. Drake's wife denied noticing any cognitive difficulties prior to the CSF leak; however, she was unsure of acute symptoms, as the patient was largely bed-bound/mostly sleeping for the next ~2 months. Since then, Mr. Drake has had difficulty with memory (e.g., forgetfulness for " "conversations), with increased reliance on reminders and notes; he can be somewhat repetitive. He is also more prone to losing his train of thought (e.g., might stop mid-sentence, with a blank stare). Tasks take longer to complete, due in part to increased fatigue, and tasks involving multiple steps/details can be overwhelming. Conversational speech is variable and can be more fluid/fluent when he is relaxed; word-finding difficulty worsens with anxiety. Mr. Drake also has difficulty tolerating certain environments, as he is overwhelmed by bright lights, crowds, and loud noises; overstimulation can result in severe and/or prolonged headaches. His wife additionally expressed concern regarding more recent onset of involuntary head movements when speaking, which have become increasingly noticeable over the past year (and worsen with anxiety).      Instrumental activities of daily living (IADLs):     - Medication management: Independent (using a pill organizer and reminder alarms), without reported difficulty or problems with adherence.   - Finances / bill paying: Wife has managed for ~5 years because Mr. Drake was not \"as organized as she wanted\" (longstanding; unrelated to current cognitive difficulties).   - Scheduling / appointments: Mostly independent (with routine use of a calendar and reminder alarms on his smart watch); no significant problems/difficulty, though wife has been more involved over the past 2 years (primarily prompting/reminding the patient to make appointments, as the process can be overwhelming, resulting in avoidance).   - Driving: Discontinued, due to photophobia.   - Cooking: Wife prepares most meals (longstanding); no notable changes in Mr. Drake's functional ability in this domain, though he may occasionally neglect to close the refrigerator door. There have not been any safety incidents/concerns.   - Shopping: No reported problems/difficulty.   - Household tasks: No reported " problems/difficulty.   - Mr. Drake is fully independent with more basic ADLs (i.e., bathing, toileting, dressing, hygiene, eating), without significant problems/difficulty; he showers 3-4x/week.     Relevant Medical Status & History:    - Sleep: Widely variable schedule, with difficulty falling and staying asleep (due to disturbance from ruminative thoughts and tinnitus). Mr. Drake often falls asleep on the couch (e.g., between 6PM and 11PM) and wakes throughout the night; he estimated averaging 4-6 hours of sleep/night. He does not feel particularly well-rested in the morning and reported significant daytime fatigue, with tendency to nap/doze. After a particularly severe headache, he may sleep the next 2 days. He snores and talks in his sleep (longstanding). He has not undergone formal sleep study.   - Appetite: “Normal”; weight has decreased with Wegovy.    - Physical activity/exercise: Physical therapy once every 2 weeks; Mr. Drake may practice PT exercises independently 2-3x/week.     Mr. Drake described balance disturbance since the neuroma resection (attributed to vestibular nerve damage from the surgery); he ambulates with a cane and is more prone to stumbling (most often to the right side). There have not been any serious falls recently. He denied any urinary changes. He reported constant right-sided head pain, with severity rated a 4 (out of 10 as most severe) during this evaluation; severity is typically 4-5. He reported lifetime history of one concussion (high school football) without prolonged/complicated recovery; no other notable head injuries. He denied any known history of seizure or stroke.     Patient Active Problem List   Diagnosis    Major depression    Depression    Crocodile tears syndrome    Cognitive dysfunction    Chronic tonsillitis    Chronic otitis media with effusion    Chronic mastoiditis of right side    Attention deficit disorder    Asymmetric tonsils    Left-sided sensorineural  hearing loss    Asymmetric SNHL (sensorineural hearing loss)    Anxiety reaction    Acoustic neuroma (Multi)    Other long term (current) drug therapy    Nausea and/or vomiting    Mixed hearing loss of right ear    Meningitis due to gram-negative bacteria (HHS-HCC)    Meningitis (HHS-HCC)    Lumbago    Insomnia    Infection associated with neurological device (CMS-HCC)    Imbalance    Hypokalemia    Fungal meningitis (HHS-HCC)    Expressive aphasia    Dryness of right eye due to decreased tear production    Vertigo    Dizziness    Other cranial cerebrospinal fluid leak    Hyperlipidemia    Encounter for adjustment and management of vascular access device    Other abnormalities of gait and mobility    Oth bacterial agents as the cause of diseases classd elswhr    Unsteady gait    Tinnitus, right    Thrush, oral    Sensorineural hearing loss (SNHL) of left ear with restricted hearing of right ear    Rhinorrhea    Retraction of tympanic membrane of left ear    Pressure sensation in right ear    Persistent headaches    Persistent cough for 3 weeks or longer    Otitis externa    Long term (current) use of antibiotics    Facial weakness    Primary hypertension    Class 1 obesity due to excess calories with serious comorbidity and body mass index (BMI) of 34.0 to 34.9 in adult    Accidental fall    Acute thoracic back pain    Common migraine with intractable migraine    Erectile dysfunction    Exposure to severe acute respiratory syndrome coronavirus 2 (SARS-CoV-2)    Fatigue    History of acoustic neuroma    History of hearing loss    Infarction of testicle    Numbness and tingling sensation of skin    Pain of joint of both hands    Persistent pain in testicle    Spermatic cord inflammation    Weight gain    Encounter for weight loss counseling    Agatston coronary artery calcium score less than 100     Current Outpatient Medications:     acetaminophen (TylenoL) 325 mg capsule, Take by mouth    ondansetron (Zofran) 4 mg  tablet, Take 1 tablet by mouth every 8 hours    rosuvastatin (Crestor) 5 mg tablet, Take 1 tablet (5 mg) by mouth once daily    sertraline (Zoloft) 100 mg tablet, 1 and 1/2 tablets each morning and 1/2 tablet each evening    SUMAtriptan (Imitrex) 50 mg tablet, Take 1 tablet for headache relief. May repeat every 2 hrs; MAX 200mg/day    traZODone (Desyrel) 50 mg tablet, Take 1 tablet (50 mg) by mouth once daily at bedtime    ubrogepant (Ubrelvy) 100 mg tablet tablet, Take 1 tablet by mouth if needed (for headache); up to 2 doses    valsartan (Diovan) 80 mg tablet, Take 1 tablet by mouth once daily    verapamil (Calan) 80 mg tablet, Take 2 tablets by mouth twice a day as directed    Vitamin B-2 100 mg tablet tablet, Take 2 tablets (200 mg) by mouth 2 times a day    acetaZOLAMIDE (Diamox) 125 mg tablet, Take 1 tablet by mouth 2 times a day    ipratropium (Atrovent) 21 mcg (0.03 %) nasal spray, 2 sprays into each nostril 3 times a day as needed    Substance Use History:    - Alcohol use: Social (estimated ~2-3 drinks 1x/month); no reported history of particularly heavy or problematic alcohol use.   - Tobacco: Denied past and present use.   - Recreational drugs: Denied past and present use.   - Caffeine: 1-2 cups of coffee in the AM.    Psychiatric Status & History: Mr. Drake reported lifelong anxiety/worry (e.g., about failure) and longstanding depression (onset in the context of his father's death in 2005). He acknowledged passive suicidal ideation 1-1.5 years ago, without associated plan, intent, past attempts, or more recent thoughts. He denied any history of homicidal ideation or auditory/visual hallucinations.    - Treatment: Currently followed by Padmini MCDOWELL (since 4/4/2023) for psychiatric medication management. Previously followed by AMBER Crump for psychotherapy/counseling (~1x/month), but currently in the process of changing providers.    - Recent mood:  On a scale from 0 (none) to 10 (most  severe), recent depression was rated a 5 (an improvement from before); recent anxiety was rated an 8.   - Recent stressors: Decline in physical, cognitive, and functional abilities for a previously active/high-functioning individual. Social activities are overwhelming/exhausting and also prompt anxiety/concerns that others may be judging him (e.g., he is self-conscious about his speech difficulty, involuntary head movement, and memory).      Developmental/Educational/Psychosocial History:     - Early development: Reportedly normal; no known birth complications or developmental delays.   - Academic history: No history of attention difficulties, learning or behavioral problems, special services/academic accommodations, or repeated/skipped grades. Grades were generally As/Bs.   - Educational attainment: Bachelor's degree in accounting from [employer]; CESAR from [employer].   - Employment history:    - Social history: , with 3 children (ages 19, 23, and 26).   - Currently lives with: Wife and oldest son (23); youngest son (19) is home for the summer, and daughter lives nearby.    Relevant Family History: No known family history of dementia, Alzheimer's disease, or other neurodegenerative condition.    - Maternal aunt ( at age 78): Memory decline in her late 70s, without formal evaluation/diagnosis.   - Sister: Brain tumor; strokes.    Procedures/Tests:  Review of available records; Adult Neuropsychology History Form; Clinical interview with Mr. Drake and his wife conjointly;  [place] Anxiety Inventory (LILIAN)  [place] Depression Inventory (BDI-2)  [place] Naming Test-Second Edition (BNT-2)  Brief Visuospatial Memory Test-Revised (BVMT-R) - Form 1   [place] Verbal Learning Test-Second Edition (CVLT-II)  Jayla-Brooks Executive Function System (D-KEFS) Verbal Fluency  Dot Counting Test (DCT)  Finger Tapping Test  Amor Complex Figure Test (RCFT) - Copy Trial   Stroop Color and Word  Test (Stroop)  Trail Making Test (TMT)  Wechsler Adult Intelligence Scale-Fourth Edition (WAIS-IV) Digit Span, Symbol Search, and Coding subtests  Wechsler Memory Scale-Fourth Edition (WMS-IV) Logical Memory  Wechsler Test of Adult Reading (WTAR)  [place]consin Card Sorting Test (WCST)    The purpose of this evaluation was reviewed, as were procedural details, issues related to confidentiality, and options for receiving feedback regarding results. All questions were answered; Mr. Drake verbalized understanding and agreed to proceed with this evaluation.    Note: All testing was administered by a psychometrist; results were interpreted in the context of the appropriate normative data.     Behavioral Observations/Neurobehavioral Status Exam: Mr. Drake arrived for the appointment late and accompanied by his wife. He was casually dressed and appropriately groomed. He ambulated with a cane; gait was somewhat slow and unsteady. There was involuntary head nodding (with somewhat mixed direction but primarily yes-yes) apparent during conversational speech; no other overt gross motor abnormalities were noted. He was alert and fully oriented to self, situation, place, and time. He wore glasses; vision was reportedly adequate for testing. Hearing and receptive language appeared largely intact on informal observation (i.e., adequate for testing purposes). Conversational speech was largely normal in rate, volume, and prosody, with occasional dysfluency due to intermittent word-finding difficulty and effortful organization of thoughts/speech (resulting in frequent pauses); there were a couple paraphasic errors. Mr. Drake was open and responsive to questions, with detailed recall of autobiographical and clinical information/events (though there was some difficulty involving the timeline of clinical events). Expressed thoughts were generally logical and goal-directed on interview, whereas he was noted to be somewhat  "tangential/distractible during testing; he occasionally lost his train of thought but was easily redirected. He demonstrated adequate insight into cognitive and functional difficulties. Affect was congruent with mood/context/thought content and generally somewhat dysphoric, with occasional appropriate brightening. There was considerable anxiety during cognitive testing, and he became quite overwhelmed at times, which seemed to interfere with task engagement/performance (e.g., he gave up easily on a story memory measure and was minimally responsive to encouragement; made excessive intrusion errors on another verbal memory measure; and demonstrated a \"yes\" response bias on 2 of 3 recognition memory tasks). He was otherwise cooperative in completing evaluation procedures.     Results/Data:       Descriptor T-Score Standard Score Z-Score Scaled Score %ile Rank   Very Superior > 70 > 130 > 2.00  > 16 > 98   Superior 63-69 120-129 1.34 to 1.99 14-15 91-97   High Average 57-62 110-119 0.67 to 1.33 12-13 75-90   Average 43-56  -0.66 to 0.66 8-11 25-74   Low Average 37-42 80-89 -1.33 to -0.67 6-7 9-24   Borderline 33-36 75-79 -1.67 to -1.34 5 5-8   Mildly Deficient 30-32 70-74 -2.00 to -1.68 4 2-4   Moderately Deficient 25-29 62-69  -2.53 to -2.01 3 1   Severely Deficient <24 <61 <-2.54 1-2 <1      Performance Validity: Results of a stand-alone performance validity measure were within valid ranges. Actual tests of ability can also be examined for unexpected, unrealistic, or invalid performances; in this regard, findings were mixed, with some indices suggesting invalid responding.     It is important to note that cutoff scores on these indices cannot always be reliably applied to individuals with significant cognitive impairment; as such, these data are interpreted with caution. Nevertheless, as mentioned above, there were instances in which Mr. Drake's anxiety/stress response seemed to impede his ability to engage in " tasks and likely compromised his test performance.    Premorbid Functioning: Based on a measure of sight-reading ability, premorbid intellectual functioning was estimated to be in the high-average range (estimated FSIQ = 112), commensurate with Mr. Drake's educational and vocational history.    Attention & Working Memory: Span of auditory attention/working memory, measured by repetition of digits, was average overall, with average digits forward, low-average digits backward, and average digits in sequence.    Processing Speed & Executive Abilities: Efficiency in matching and reproducing symbols associated with numbers was average; speeded symbol search/matching was in the superior range; resultant processing speed index was high average (WAIS-IV PSI = 111). Speeded word reading and color naming were severely deficient; color naming of incongruent color words (e.g., blue ink spelling the word “red”) was moderately deficient, with overall performance pattern suggesting average selective attention.     Performance on a timed task that requires simultaneous visual scanning, number sequencing, and fine-motor coordination was average, with 1 error. When the task became more complex, requiring alternating attention to sequencing numbers and letters, performance was moderately deficient, with 0 errors. A comparison of scores on the two tasks suggested relative difficulty with shifting of mental set.    On a computerized concept-formation and problem-solving task that involves matching cards by shared fundamental attributes and using hypothesis testing (i.e., trial and error) to inform responses, overall performance was well below average. Mr. Drake did not effectively utilize performance feedback to guide/adapt his approach and ultimately completed only 1 of 6 target solutions. There were instances in which he discontinued a successful strategy after 5, 6, and 7 consecutive correct responses, suggesting a degree of  "inattention/distractibility. He also demonstrated a somewhat unusual response pattern at times, in that there was an excessive number of attempts to match cards that did not share any essential attributes.     Learning & Memory: Immediate recall of logically organized semantic information in the form of two stories was severely deficient. After a 20-minute delay, recall was severely deficient; Mr. Drake was unable to provide any story details and did not benefit from semantic reminder cues. Delayed recognition of story detail was in the severely deficient range.     Learning and recall of a supraspan 16-item word list over five trials was low average, with an average learning slope (trial scores: 3, 6, 7, 8, 9) and high-average recall consistency. Use of semantic clustering strategy during list learning was low average; rather, Mr. Drake largely employed a less efficient (serial clustering) strategy, with significant recency/primacy effect (i.e., tendency to remember words from the end and beginning of the list). Immediate recall of a second 16-word distractor list was mildly deficient. Subsequent free and cued recall of the original word list were low average (5 and 7 correct responses, respectively). After a 20-minute delay, free recall was in the borderline range (4 correct); cued recall was low average (7 correct). Across recall trials, there was an elevated number of repetitions (11) and an excessive number of intrusion errors (68). Delayed recognition was average (14/16), with an excessive number of false-positive errors (15); ability to distinguish list words from non-list foils was mildly deficient, with a significant \"yes\" response bias.     Regarding visuospatial memory, Mr. Jades ability to learn and recall an array of six geometric designs over three trials was in the low-average range, with a strong/high-average learning curve. Later recall was average (100% of best learning trial), and recognition " "of the designs was in the low-average range, with a \"yes\" response bias (6 correct; 1 false-positive error).     Language Functioning: Naming of common objects was in the borderline range (52/60), with significant benefit from phonemic cues (8/8). Letter fluency (i.e., rapid generation of words beginning with a given letter over three one-minute trials) was average when compared to same-aged peers (and low-average when normative comparison additionally considered years of education). Category fluency (i.e., rapid generation of words in a specific category) was average when compared to same-aged peers (with mildly deficient animal fluency, which uses education-corrected normative comparisons). Category fluency while switching back and forth between two categories was in the borderline range. There were an average number of repetitions and set-loss errors across verbal fluency tasks.     Visuospatial Functioning: Ability to copy an array of two-dimensional geometric designs was intact. In copying a complex figure, Mr. Darke began with a somewhat piecemeal/inefficient approach, which gradually developed into a more holistic/systematic strategy; overall construction accuracy was average.       Motor Functioning: Fine motor speed, as measured by finger tapping, was low average bilaterally.    Emotional Functioning: On face-valid self-report inventories assessing mood and emotional status, Mr. Drake's responses suggested moderate depression and moderate anxiety.        Time-based service: 17218 (60 minutes); 33355 (33 minutes); 47372 (60 minutes); 60317 (117 minutes); 63815 (30 minutes); 28998 (164 minutes)  "

## 2024-06-11 ENCOUNTER — TELEMEDICINE (OUTPATIENT)
Dept: PSYCHOLOGY | Facility: CLINIC | Age: 59
End: 2024-06-11
Payer: COMMERCIAL

## 2024-06-11 DIAGNOSIS — F32.A DEPRESSION, UNSPECIFIED DEPRESSION TYPE: ICD-10-CM

## 2024-06-11 DIAGNOSIS — F41.9 ANXIETY: ICD-10-CM

## 2024-06-11 DIAGNOSIS — F54 PSYCHOLOGICAL FACTORS AFFECTING MEDICAL CONDITION: ICD-10-CM

## 2024-06-11 DIAGNOSIS — G31.84 MILD COGNITIVE IMPAIRMENT: ICD-10-CM

## 2024-06-11 DIAGNOSIS — F09 COGNITIVE AND NEUROBEHAVIORAL DYSFUNCTION: ICD-10-CM

## 2024-06-11 DIAGNOSIS — R41.3 MEMORY DISTURBANCE: Primary | ICD-10-CM

## 2024-06-11 DIAGNOSIS — R68.89 SOMATIC COMPLAINTS, MULTIPLE: ICD-10-CM

## 2024-06-11 PROCEDURE — 96138 PSYCL/NRPSYC TECH 1ST: CPT | Performed by: PSYCHOLOGIST

## 2024-06-11 PROCEDURE — 96121 NUBHVL XM PHY/QHP EA ADDL HR: CPT | Performed by: PSYCHOLOGIST

## 2024-06-11 PROCEDURE — 96133 NRPSYC TST EVAL PHYS/QHP EA: CPT | Performed by: PSYCHOLOGIST

## 2024-06-11 PROCEDURE — 96116 NUBHVL XM PHYS/QHP 1ST HR: CPT | Performed by: PSYCHOLOGIST

## 2024-06-11 PROCEDURE — 96139 PSYCL/NRPSYC TST TECH EA: CPT | Performed by: PSYCHOLOGIST

## 2024-06-11 PROCEDURE — 96132 NRPSYC TST EVAL PHYS/QHP 1ST: CPT | Performed by: PSYCHOLOGIST

## 2024-06-11 NOTE — PROGRESS NOTES
Neuropsychology Note    Name: Andrey Drake  : 1965  MRN: [id number]      Date of Service: 2024     Reason For Visit: Mr. 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 The full report can be found in the note for that visit.     This was a telehealth appointment for feedback regarding evaluation results, held via an interactive audio and video telecommunication system that permits real-time communications between patient (at home) and provider (in office).     Summary: Mr. Drake was seen today to discuss findings from his neuropsychological evaluation on 2024; he was accompanied by his wife.    Results were reviewed and explained, with deficits primarily in the domain of executive functioning, including the executive aspects of learning/memory and language. There was also apparent interference from psychological factors (e.g., stress, anxiety) on task engagement at times, resulting in a degree of variability in performance within cognitive domains.     There was a thorough discussion of clinical impressions, including diagnosis of mild cognitive impairment; difficulties are largely consistent with Mr. Drake's neurological history (as executive dysfunction can be associated with     Psychoeducation was provided regarding the effect of stress, anxiety, depression, somatic symptoms (e.g., photophobia, phonophobia, headaches, chronic pain), and fatigue/lack of restorative sleep on cognitive functioning. Recommendations were discussed, and questions were addressed.    Mr. Drake was appropriately engaged in the appointment; affect was generally euthymic and congruent with mood/context/thought content. He and his wife verbalized understanding of findings, impressions, and recommendations.          Time-based service:     - Evaluation: 47386 (60 minutes); 57494 (33 minutes); 39927 (60 minutes); 63550 (117 minutes); 87905 (30 minutes); 97669 (164 minutes)   - Feedback via telehealth: 82940 (53 minutes)

## 2024-06-13 DIAGNOSIS — I10 PRIMARY HYPERTENSION: ICD-10-CM

## 2024-06-14 RX ORDER — VALSARTAN 80 MG/1
80 TABLET ORAL DAILY
Qty: 90 TABLET | Refills: 0 | Status: SHIPPED | OUTPATIENT
Start: 2024-06-14

## 2024-07-01 DIAGNOSIS — R51.9 PERSISTENT HEADACHES: ICD-10-CM

## 2024-07-01 RX ORDER — ACETAZOLAMIDE 125 MG/1
125 TABLET ORAL 2 TIMES DAILY
Qty: 60 TABLET | Refills: 2 | Status: SHIPPED | OUTPATIENT
Start: 2024-07-01 | End: 2024-12-28

## 2024-07-11 ENCOUNTER — LAB (OUTPATIENT)
Dept: LAB | Facility: LAB | Age: 59
End: 2024-07-11
Payer: COMMERCIAL

## 2024-07-11 DIAGNOSIS — E78.2 MIXED HYPERLIPIDEMIA: ICD-10-CM

## 2024-07-11 LAB
ALBUMIN SERPL BCP-MCNC: 4.5 G/DL (ref 3.4–5)
ALP SERPL-CCNC: 67 U/L (ref 33–120)
ALT SERPL W P-5'-P-CCNC: 18 U/L (ref 10–52)
ANION GAP SERPL CALC-SCNC: 12 MMOL/L (ref 10–20)
AST SERPL W P-5'-P-CCNC: 13 U/L (ref 9–39)
BILIRUB SERPL-MCNC: 0.5 MG/DL (ref 0–1.2)
BUN SERPL-MCNC: 19 MG/DL (ref 6–23)
CALCIUM SERPL-MCNC: 9 MG/DL (ref 8.6–10.3)
CHLORIDE SERPL-SCNC: 110 MMOL/L (ref 98–107)
CHOLEST SERPL-MCNC: 159 MG/DL (ref 0–199)
CHOLESTEROL/HDL RATIO: 4.1
CO2 SERPL-SCNC: 22 MMOL/L (ref 21–32)
CREAT SERPL-MCNC: 0.84 MG/DL (ref 0.5–1.3)
EGFRCR SERPLBLD CKD-EPI 2021: >90 ML/MIN/1.73M*2
GLUCOSE SERPL-MCNC: 99 MG/DL (ref 74–99)
HDLC SERPL-MCNC: 38.6 MG/DL
LDLC SERPL CALC-MCNC: 74 MG/DL
NON HDL CHOLESTEROL: 120 MG/DL (ref 0–149)
POTASSIUM SERPL-SCNC: 4 MMOL/L (ref 3.5–5.3)
PROT SERPL-MCNC: 6.9 G/DL (ref 6.4–8.2)
SODIUM SERPL-SCNC: 140 MMOL/L (ref 136–145)
TRIGL SERPL-MCNC: 230 MG/DL (ref 0–149)
VLDL: 46 MG/DL (ref 0–40)

## 2024-07-11 PROCEDURE — 80061 LIPID PANEL: CPT

## 2024-07-11 PROCEDURE — 80053 COMPREHEN METABOLIC PANEL: CPT

## 2024-07-11 PROCEDURE — 36415 COLL VENOUS BLD VENIPUNCTURE: CPT

## 2024-08-05 ENCOUNTER — HOSPITAL ENCOUNTER (OUTPATIENT)
Dept: RADIOLOGY | Facility: EXTERNAL LOCATION | Age: 59
Discharge: HOME | End: 2024-08-05
Payer: COMMERCIAL

## 2024-08-05 DIAGNOSIS — M79.672 LEFT FOOT PAIN: ICD-10-CM

## 2024-08-05 DIAGNOSIS — R51.9 PERSISTENT HEADACHES: ICD-10-CM

## 2024-08-07 RX ORDER — ACETAZOLAMIDE 125 MG/1
125 TABLET ORAL 2 TIMES DAILY
Qty: 180 TABLET | Refills: 1 | Status: SHIPPED | OUTPATIENT
Start: 2024-08-07 | End: 2025-02-03

## 2024-08-29 ENCOUNTER — APPOINTMENT (OUTPATIENT)
Dept: BEHAVIORAL HEALTH | Facility: CLINIC | Age: 59
End: 2024-08-29
Payer: COMMERCIAL

## 2024-08-29 DIAGNOSIS — F41.1 GAD (GENERALIZED ANXIETY DISORDER): ICD-10-CM

## 2024-08-29 DIAGNOSIS — G47.00 INSOMNIA, UNSPECIFIED TYPE: ICD-10-CM

## 2024-08-29 DIAGNOSIS — F32.1 CURRENT MODERATE EPISODE OF MAJOR DEPRESSIVE DISORDER WITHOUT PRIOR EPISODE (MULTI): ICD-10-CM

## 2024-08-29 PROCEDURE — 99214 OFFICE O/P EST MOD 30 MIN: CPT | Performed by: CLINICAL NURSE SPECIALIST

## 2024-09-04 ENCOUNTER — APPOINTMENT (OUTPATIENT)
Dept: PRIMARY CARE | Facility: CLINIC | Age: 59
End: 2024-09-04
Payer: COMMERCIAL

## 2024-09-04 VITALS
HEART RATE: 74 BPM | HEIGHT: 74 IN | BODY MASS INDEX: 33.11 KG/M2 | TEMPERATURE: 98 F | WEIGHT: 258 LBS | DIASTOLIC BLOOD PRESSURE: 83 MMHG | SYSTOLIC BLOOD PRESSURE: 123 MMHG

## 2024-09-04 DIAGNOSIS — I10 PRIMARY HYPERTENSION: ICD-10-CM

## 2024-09-04 DIAGNOSIS — M79.672 LEFT FOOT PAIN: ICD-10-CM

## 2024-09-04 DIAGNOSIS — R09.02 HYPOXIA: ICD-10-CM

## 2024-09-04 DIAGNOSIS — R06.09 DYSPNEA ON EXERTION: ICD-10-CM

## 2024-09-04 DIAGNOSIS — J98.6 CHRONICALLY ELEVATED HEMIDIAPHRAGM: ICD-10-CM

## 2024-09-04 DIAGNOSIS — R79.89 LOW VITAMIN B12 LEVEL: ICD-10-CM

## 2024-09-04 DIAGNOSIS — S96.909A INJURY OF TENDON OF FOOT: ICD-10-CM

## 2024-09-04 DIAGNOSIS — Z12.5 PROSTATE CANCER SCREENING: ICD-10-CM

## 2024-09-04 DIAGNOSIS — Z00.00 HEALTH MAINTENANCE EXAMINATION: Primary | ICD-10-CM

## 2024-09-04 DIAGNOSIS — Z23 NEED FOR INFLUENZA VACCINATION: ICD-10-CM

## 2024-09-04 DIAGNOSIS — Z12.11 COLON CANCER SCREENING: ICD-10-CM

## 2024-09-04 DIAGNOSIS — S99.922A FOOT INJURY, LEFT, INITIAL ENCOUNTER: ICD-10-CM

## 2024-09-04 DIAGNOSIS — E78.2 MIXED HYPERLIPIDEMIA: ICD-10-CM

## 2024-09-04 PROCEDURE — 99214 OFFICE O/P EST MOD 30 MIN: CPT | Performed by: INTERNAL MEDICINE

## 2024-09-04 PROCEDURE — 90656 IIV3 VACC NO PRSV 0.5 ML IM: CPT | Performed by: INTERNAL MEDICINE

## 2024-09-04 PROCEDURE — G0439 PPPS, SUBSEQ VISIT: HCPCS | Performed by: INTERNAL MEDICINE

## 2024-09-04 PROCEDURE — 3008F BODY MASS INDEX DOCD: CPT | Performed by: INTERNAL MEDICINE

## 2024-09-04 PROCEDURE — 90471 IMMUNIZATION ADMIN: CPT | Performed by: INTERNAL MEDICINE

## 2024-09-04 PROCEDURE — 3074F SYST BP LT 130 MM HG: CPT | Performed by: INTERNAL MEDICINE

## 2024-09-04 PROCEDURE — 1036F TOBACCO NON-USER: CPT | Performed by: INTERNAL MEDICINE

## 2024-09-04 PROCEDURE — 3079F DIAST BP 80-89 MM HG: CPT | Performed by: INTERNAL MEDICINE

## 2024-09-04 RX ORDER — VALSARTAN 80 MG/1
80 TABLET ORAL DAILY
Qty: 90 TABLET | Refills: 1 | Status: SHIPPED | OUTPATIENT
Start: 2024-09-04

## 2024-09-04 ASSESSMENT — PATIENT HEALTH QUESTIONNAIRE - PHQ9
2. FEELING DOWN, DEPRESSED OR HOPELESS: NOT AT ALL
1. LITTLE INTEREST OR PLEASURE IN DOING THINGS: NOT AT ALL
SUM OF ALL RESPONSES TO PHQ9 QUESTIONS 1 AND 2: 0

## 2024-09-04 ASSESSMENT — ENCOUNTER SYMPTOMS
LOSS OF SENSATION IN FEET: 0
PALPITATIONS: 0
DYSURIA: 0
FEVER: 0
SHORTNESS OF BREATH: 1
ABDOMINAL PAIN: 0
LIGHT-HEADEDNESS: 0
CHILLS: 0
AGITATION: 0
VOMITING: 0
BLOOD IN STOOL: 0
DIZZINESS: 0
DIARRHEA: 0
OCCASIONAL FEELINGS OF UNSTEADINESS: 0
COUGH: 0
NAUSEA: 0
SORE THROAT: 0
DEPRESSION: 0

## 2024-09-04 ASSESSMENT — ACTIVITIES OF DAILY LIVING (ADL)
DOING_HOUSEWORK: INDEPENDENT
DRESSING: INDEPENDENT
TAKING_MEDICATION: INDEPENDENT
GROCERY_SHOPPING: INDEPENDENT
BATHING: INDEPENDENT
MANAGING_FINANCES: INDEPENDENT

## 2024-09-04 ASSESSMENT — COLUMBIA-SUICIDE SEVERITY RATING SCALE - C-SSRS
2. HAVE YOU ACTUALLY HAD ANY THOUGHTS OF KILLING YOURSELF?: NO
6. HAVE YOU EVER DONE ANYTHING, STARTED TO DO ANYTHING, OR PREPARED TO DO ANYTHING TO END YOUR LIFE?: NO
1. IN THE PAST MONTH, HAVE YOU WISHED YOU WERE DEAD OR WISHED YOU COULD GO TO SLEEP AND NOT WAKE UP?: NO

## 2024-09-04 NOTE — ASSESSMENT & PLAN NOTE
Orders:  •  Colonoscopy Screening; Average Risk Patient; Future  
   Orders:  •  Flu vaccine, trivalent, preservative free, age 6 months and greater (Fluraix/Fluzone/Flulaval)  
   Orders:  •  MR foot left wo IV contrast; Future  
   Orders:  •  Prostate Spec.Ag,Screen; Future  
   Orders:  •  Referral to Pulmonology; Future  
   Orders:  •  Vitamin B12; Future  
  Orders:    CBC and Auto Differential; Future    
  Orders:    valsartan (Diovan) 80 mg tablet; Take 1 tablet (80 mg) by mouth once daily.    CBC and Auto Differential; Future    
Unknown

## 2024-09-04 NOTE — PROGRESS NOTES
Subjective   Reason for Visit: Andrey Drake is an 59 y.o. male here for a Medicare Wellness visit.     Past Medical, Surgical, and Family History reviewed and updated in chart.    Reviewed all medications by prescribing practitioner or clinical pharmacist (such as prescriptions, OTCs, herbal therapies and supplements) and documented in the medical record.     HPI  Patient presents for awv today. Patient states he injured his left foot 1 month ago and it is still bothering him. Patient was seen at urgent care and had xrays for this.  I have reviewed the xrays and there was no abnormality.   Patient fell forward 1 month ago.  States his face hit the couch.  No LOC. The left ankle/foot rolled with an inversion injury. There was no fracture. Patient having continued pain of the left lateral foot. Patient having problems with walking do to the pain. Pain is sharp. Patient states the pain is not in the ankle joint itself but just inferior to this.  Patient states the swelling is still present.  No redness.  Still has some residual bruising.  He has been using elevation ice and over-the-counter NSAIDs without relief.  States he expected the injury to improve by this time.  Patient is concerned about a tendon injury.  Patient also here for annual wellness visit he is up-to-date on age and gender recommend screening exception of colonoscopy and PSA test which has been ordered.  Patient is up-to-date on age and gender recommended immunizations exception of influenza vaccine which will be given today.  Denies any fever, chills, chest pain, nausea, vomiting diarrhea, abdominal pain.    Patient also has concerns about dyspnea on exertion states he was told he had some chronic hemidiaphragm on the right anterior he had hospitalization in the past.  Has not seen a pulmonologist for this.  States that his smart watch is a [employer] smart watch that will read that he has low blood oxygen in the evening when he is just sitting.   "States it will go down in the 80s.  Denies any actual chest pain no palpitations.  No orthopnea.  States he does have some difficulty breathing through his nose he has had this from procedures and hospitalizations where they had to utilize tubes through the nose.    Patient Care Team:  Zhang Lara DO as PCP - General  Zhang Lara DO as PCP - [employer]age PCP  KATHY Lucas-CNS as Nurse Practitioner (Geriatric Psychiatry)     Review of Systems   Constitutional:  Negative for chills and fever.   HENT:  Negative for sore throat.    Eyes:  Negative for visual disturbance.   Respiratory:  Positive for shortness of breath. Negative for cough.    Cardiovascular:  Negative for chest pain, palpitations and leg swelling.   Gastrointestinal:  Negative for abdominal pain, blood in stool, diarrhea, nausea and vomiting.   Genitourinary:  Negative for dysuria.   Skin:  Negative for rash.   Neurological:  Negative for dizziness, syncope and light-headedness.   Psychiatric/Behavioral:  Negative for agitation.        Objective   Vitals:  /83   Pulse 74   Temp 36.7 °C (98 °F) (Temporal)   Ht 1.88 m (6' 2\")   Wt 117 kg (258 lb)   BMI 33.13 kg/m²       Physical Exam  Vitals and nursing note reviewed.   Constitutional:       General: He is not in acute distress.     Appearance: Normal appearance. He is obese. He is not ill-appearing, toxic-appearing or diaphoretic.   HENT:      Head: Normocephalic and atraumatic.      Mouth/Throat:      Mouth: Mucous membranes are moist.      Pharynx: Oropharynx is clear. No oropharyngeal exudate.   Eyes:      Pupils: Pupils are equal, round, and reactive to light.   Cardiovascular:      Rate and Rhythm: Normal rate and regular rhythm.      Heart sounds: Normal heart sounds.   Pulmonary:      Effort: Pulmonary effort is normal. No respiratory distress.      Breath sounds: Normal breath sounds. No wheezing, rhonchi or rales.   Abdominal:      General: There is no " distension.      Palpations: Abdomen is soft. There is no mass.      Tenderness: There is no abdominal tenderness.   Musculoskeletal:         General: Swelling (left lateral proximal foot) and tenderness (left lateral foot at the base of the 5th metatarsal to the cuboid and tarsometatarsal joint) present.      Cervical back: Neck supple.      Right lower leg: No edema.      Left lower leg: No edema.      Comments: Increased pain with inversion of the foot   Lymphadenopathy:      Cervical: No cervical adenopathy.   Skin:     General: Skin is warm and dry.      Coloration: Skin is not jaundiced or pale.      Findings: No rash.   Neurological:      General: No focal deficit present.      Mental Status: He is alert and oriented to person, place, and time.   Psychiatric:         Mood and Affect: Mood normal.         Behavior: Behavior normal.         Thought Content: Thought content normal.         Judgment: Judgment normal.         Assessment & Plan  Need for influenza vaccination    Orders:    Flu vaccine, trivalent, preservative free, age 6 months and greater (Fluraix/Fluzone/Flulaval)    Primary hypertension    Orders:    valsartan (Diovan) 80 mg tablet; Take 1 tablet (80 mg) by mouth once daily.    CBC and Auto Differential; Future    Colon cancer screening    Orders:    Colonoscopy Screening; Average Risk Patient; Future    Low vitamin B12 level    Orders:    Vitamin B12; Future    Prostate cancer screening    Orders:    Prostate Spec.Ag,Screen; Future    Mixed hyperlipidemia    Orders:    CBC and Auto Differential; Future    Dyspnea on exertion    Orders:    Referral to Pulmonology; Future    Hypoxia    Orders:    Referral to Pulmonology; Future    Chronically elevated hemidiaphragm    Orders:    Referral to Pulmonology; Future    Left foot pain    Orders:    MR foot left wo IV contrast; Future    Foot injury, left, initial encounter    Orders:    MR foot left wo IV contrast; Future    Injury of tendon of  foot    Orders:    MR foot left wo IV contrast; Future    Health maintenance examination          Health maintenance examination: Patient up-to-date on age and recommended screening exception of colonoscopy and PSA which has been ordered.  He is due for influenza vaccine which will be given today.    Hypertension: Chronic, stable refills provided for valsartan hand    Colon cancer screening: Colonoscopy has been ordered    Vitamin B12 deficiency: Will check a vitamin B12 level    Prostate cancer screening will check a PSA    Mixed hyperlipidemia: Chronic, stable he will continue rosuvastatin at this time    Dyspnea on exertion: Given his reports of hypoxia on his smart watch and also dyspnea on exertion we have referred him to pulmonology.  He is not having any chest pain.  He does have a history of a chronic mildly right hemidiaphragm which may be contributing to these findings.  Further evaluation to be obtained as discussed    Left foot pain/left foot injury/injury of tendon of foot: Suspect tendon injury the left foot given his pain swelling duration of symptoms.  He has already tried conservative therapy but yet has not had not improved since his injury therefore we have ordered MRI for further evaluation of the left foot.

## 2024-09-05 NOTE — PROGRESS NOTES
Outpatient Psychiatry      Subjective   Andrey Drake, a 59 y.o. male, presents as an established patient for an outpatient appointment /medication management in psychiatry  for an audio visual virtual appointment in real time , he was at home for the appointment and verbally consented to the appointment      Diagnosis: ·      RONDA (generalized anxiety disorder) (300.02) (F41.1) mild    · Insomnia unspecified type mild G47.00   · Depression, major moderate ( primary diagnoses) F 32.1     Treatment Plan/Recommendations:   Follow-up plan  was discussed with patient.  can follow up in 4-6 weeks , can continue self care and wellness efforts and maintain routine health screenings can call  for treatment and scheduling concerns  Psychotropic medications :  Continue sertraline 100 mg 1 and 1/2 tablets each morning and 1/2 tablet each evening for depression and anxiety   Continue trazodone 50 mg , daily each night as needed at bedtime for sleep / insomnia       HPI:  reviewed the notes in the [facility] emr from other medical providers    mood remains depressed , he spoke about the factors for this   isolates at times to himself , sometimes ruminates on conversations and social events when he wonders what others think of him   Everyday tasks are difficult   At night gets ringing in his ears , he goes over conversations in his mind repeatedly in a ruminative manner   has difficulty with word finding and speech , this is worse with anxiety, he will be starting speech therapy soon   encouraged him to verbalize his thoughts and feelings   He identifies his wife as supportive  there is no indication of issues with mood cycling   no psychoses   he actively participated in discussion   affect is congruent , depressed   no panic attacks recently   no thoughts of harming himself or others   denies issues or history of substance abuse  is tolerating sertraline   no psychoses     Psych ros and medical ros as noted above         Review with patient: Treatment plan reviewed with the patient.  Medication risks/benefit reviewed with the patient    Current Outpatient Medications:     acetaminophen (TylenoL) 325 mg capsule, Take by mouth., Disp: , Rfl:     acetaZOLAMIDE (Diamox) 125 mg tablet, TAKE 1 TABLET (125 MG) BY MOUTH 2 TIMES A DAY., Disp: 180 tablet, Rfl: 1    ipratropium (Atrovent) 21 mcg (0.03 %) nasal spray, SPRAY 2 SPRAYS INTO EACH NOSTRIL 3 TIMES A DAY AS NEEDED, Disp: , Rfl:     LORazepam (Ativan) 0.5 mg tablet, Take by mouth., Disp: , Rfl:     methocarbamol (Robaxin) 500 mg tablet, 2 tablet EVERY 8 HOURS (route: oral), Disp: , Rfl:     ondansetron (Zofran) 4 mg tablet, 1 tablet EVERY 8 HOURS (route: oral), Disp: , Rfl:     rizatriptan (Maxalt) 10 mg tablet, TAKE 1 TABLET AT ONSET OF HEADACHE MAY REPEAT EVERY 2 HOURS AS NEEDED. MAX 3 TABS/24 HRS, Disp: , Rfl:     rosuvastatin (Crestor) 5 mg tablet, Take 1 tablet (5 mg) by mouth once daily., Disp: 90 tablet, Rfl: 3    sertraline (Zoloft) 100 mg tablet, TAKE 1.5 TABLETS BY MOUTH ONCE DAILY. (Patient taking differently: Take 2 tablets (200 mg) by mouth once daily. TAKE 1.5 TABLETS in the morning and 0.5 tablets in the evening BY MOUTH ONCE DAILY.), Disp: 135 tablet, Rfl: 0    sodium chloride 0.9% (Normal Saline Flush) flush, 10 mL EVERY 8 HOURS (route: injection), Disp: , Rfl:     SUMAtriptan (Imitrex) 50 mg tablet, TAKE 1 TABLET FOR HEADACHE RELIEF. MAY REPEAT EVERY 2 HOURS. MAX 200MG/DAY., Disp: , Rfl:     tetrahydrozoline-zinc (Visine-AC) 0.05-0.25 % ophthalmic solution, Administer into affected eye(s) twice a day., Disp: , Rfl:     traZODone (Desyrel) 50 mg tablet, Take 1 tablet (50 mg) by mouth once daily at bedtime., Disp: 90 tablet, Rfl: 0    ubrogepant (Ubrelvy) 100 mg tablet tablet, Take 1 tablet (100 mg) by mouth 1 time if needed (headache) for up to 2 doses. (Patient not taking: Reported on 9/4/2024), Disp: 2 tablet, Rfl: 0    valsartan (Diovan) 80 mg tablet, Take 1  tablet (80 mg) by mouth once daily., Disp: 90 tablet, Rfl: 1    verapamil (Calan) 80 mg tablet, TAKE 2 TABLETS BY MOUTH TWICE A DAY AS DIRECTED, Disp: 360 tablet, Rfl: 1    Vitamin B-2 100 mg tablet tablet, Take 2 tablets (200 mg) by mouth 2 times a day., Disp: , Rfl:     white petrolatum-mineral oiL 94-3 % ophthalmic ointment, Apply to affected eye(s)., Disp: , Rfl:   Medical History:  Past Medical History:   Diagnosis Date    Personal history of other diseases of the nervous system and sense organs     History of hearing loss    Personal history of other specified conditions     History of snoring    Unspecified nonsuppurative otitis media, right ear 10/21/2020    Fluid level behind tympanic membrane of right ear     Surgical History:  Past Surgical History:   Procedure Laterality Date    OTHER SURGICAL HISTORY  08/19/2020    Shoulder surgery    OTHER SURGICAL HISTORY  01/20/2022    Craniotomy     Family History:  Family History   Problem Relation Name Age of Onset    No Known Problems Mother      Hypotension Other       Social History:  Social History     Socioeconomic History    Marital status:      Spouse name: Not on file    Number of children: Not on file    Years of education: Not on file    Highest education level: Not on file   Occupational History    Not on file   Tobacco Use    Smoking status: Never     Passive exposure: Never    Smokeless tobacco: Never   Vaping Use    Vaping status: Never Used   Substance and Sexual Activity    Alcohol use: Yes     Alcohol/week: 2.0 standard drinks of alcohol     Types: 2 Cans of beer per week    Drug use: Never    Sexual activity: Not on file   Other Topics Concern    Not on file   Social History Narrative    Not on file     Social Determinants of Health     Financial Resource Strain: Not on file   Food Insecurity: Not on file   Transportation Needs: Not on file   Physical Activity: Not on file   Stress: Not on file   Social Connections: Not on file   Intimate  Partner Violence: Not on file   Housing Stability: Not on file     Vitals:  There were no vitals filed for this visit.    Padmini Gerard, APRN-CNS

## 2024-09-26 ENCOUNTER — APPOINTMENT (OUTPATIENT)
Dept: BEHAVIORAL HEALTH | Facility: CLINIC | Age: 59
End: 2024-09-26
Payer: COMMERCIAL

## 2024-09-26 DIAGNOSIS — G47.00 INSOMNIA, UNSPECIFIED TYPE: ICD-10-CM

## 2024-09-26 DIAGNOSIS — F33.1 DEPRESSION, MAJOR, RECURRENT, MODERATE: ICD-10-CM

## 2024-09-26 DIAGNOSIS — F41.1 GAD (GENERALIZED ANXIETY DISORDER): ICD-10-CM

## 2024-09-26 DIAGNOSIS — F33.1 MODERATE EPISODE OF RECURRENT MAJOR DEPRESSIVE DISORDER: ICD-10-CM

## 2024-09-26 PROCEDURE — 99214 OFFICE O/P EST MOD 30 MIN: CPT | Performed by: CLINICAL NURSE SPECIALIST

## 2024-09-26 RX ORDER — SERTRALINE HYDROCHLORIDE 100 MG/1
200 TABLET, FILM COATED ORAL DAILY
Qty: 180 TABLET | Refills: 0 | Status: SHIPPED | OUTPATIENT
Start: 2024-09-26 | End: 2024-12-25

## 2024-09-26 RX ORDER — TRAZODONE HYDROCHLORIDE 50 MG/1
TABLET ORAL
Qty: 180 TABLET | Refills: 0 | Status: SHIPPED | OUTPATIENT
Start: 2024-09-26

## 2024-09-26 RX ORDER — TRAZODONE HYDROCHLORIDE 50 MG/1
100 TABLET ORAL NIGHTLY
Qty: 180 TABLET | Refills: 1 | Status: SHIPPED | OUTPATIENT
Start: 2024-09-26 | End: 2024-09-26

## 2024-09-26 NOTE — PROGRESS NOTES
Outpatient Psychiatry      Subjective   Andrey Drake, a 59 y.o. male, presents as an established patient for an outpatient appointment /medication management in psychiatry  for an audio and video virtual appointment in real time , he was at home for the appointment   Virtual or Telephone Consent    An interactive audio and video telecommunication system which permits real time communications between the patient (at the originating site) and provider (at the distant site) was utilized to provide this telehealth service.   Verbal consent was requested and obtained from Andrey Drake on this date, 10/01/24 for a telehealth visit.      Diagnosis: ·      RONDA (generalized anxiety disorder) (300.02) (F41.1) moderate    · Insomnia unspecified type mild G47.00   · Depression, major recurrent ,moderate ( primary diagnoses) F 33.1     Treatment Plan/Recommendations:   Follow-up plan  was discussed with patient.  can follow up in 4-6 weeks , can continue self care and wellness efforts and maintain routine health screenings can call  for treatment and scheduling concerns  Psychotropic medications :  Continue sertraline 100 mg 2 tablets each morning for depression and anxiety    Continue trazodone 50 mg , daily each night as needed at bedtime for sleep / insomnia       HPI:  reviewed the notes in the [facility] emr from other medical providers    mood remains depressed , he spoke about the factors for this   isolates at times to himself sometimes ruminates on conversations and social events when he wonders what others think of him   Everyday tasks are difficult   At night gets ringing in his ears , he goes over conversations in his mind repeatedly in a ruminative manner   has not been sleeping well   there is no indication of issues with mood cycling   no psychoses   he actively participated in discussion   affect is congruent , depressed   no panic attacks recently   no thoughts of harming himself or others    denies issues or history of substance abuse  is tolerating sertraline   no psychoses      Psych ros and medical ros as noted above         Patient Active Problem List   Diagnosis    Major depression    Depression    Crocodile tears syndrome    Cognitive dysfunction    Chronic tonsillitis    Chronic otitis media with effusion    Chronic mastoiditis of right side    Attention deficit disorder    Asymmetric tonsils    Left-sided sensorineural hearing loss    Asymmetric SNHL (sensorineural hearing loss)    Anxiety reaction    Acoustic neuroma (Multi)    Other long term (current) drug therapy    Nausea and/or vomiting    Mixed hearing loss of right ear    Meningitis due to gram-negative bacteria (HHS-HCC)    Meningitis (HHS-HCC)    Lumbago    Insomnia    Infection associated with neurological device (CMS-HCC)    Imbalance    Hypokalemia    Fungal meningitis (HHS-HCC)    Expressive aphasia    Dryness of right eye due to decreased tear production    Vertigo    Dizziness    Other cranial cerebrospinal fluid leak    Hyperlipidemia    Encounter for adjustment and management of vascular access device    Other abnormalities of gait and mobility    Oth bacterial agents as the cause of diseases classd elswhr    Unsteady gait    Tinnitus, right    Thrush, oral    Sensorineural hearing loss (SNHL) of left ear with restricted hearing of right ear    Rhinorrhea    Retraction of tympanic membrane of left ear    Pressure sensation in right ear    Persistent headaches    Persistent cough for 3 weeks or longer    Otitis externa    Long term (current) use of antibiotics    Facial weakness    Primary hypertension    Class 1 obesity due to excess calories with serious comorbidity and body mass index (BMI) of 34.0 to 34.9 in adult    Accidental fall    Acute thoracic back pain    Common migraine with intractable migraine    Erectile dysfunction    Exposure to severe acute respiratory syndrome coronavirus 2 (SARS-CoV-2)    Fatigue     History of acoustic neuroma    History of hearing loss    Infarction of testicle    Numbness and tingling sensation of skin    Pain of joint of both hands    Persistent pain in testicle    Spermatic cord inflammation    Weight gain    Encounter for weight loss counseling    Agatston coronary artery calcium score less than 100    Health maintenance examination    Injury of tendon of foot    Foot injury, left, initial encounter    Left foot pain    Chronically elevated hemidiaphragm    Hypoxia    Dyspnea on exertion    Prostate cancer screening    Low vitamin B12 level    Colon cancer screening    Need for influenza vaccination     Current Outpatient Medications:     acetaminophen (TylenoL) 325 mg capsule, Take by mouth., Disp: , Rfl:     acetaZOLAMIDE (Diamox) 125 mg tablet, TAKE 1 TABLET (125 MG) BY MOUTH 2 TIMES A DAY., Disp: 180 tablet, Rfl: 1    ipratropium (Atrovent) 21 mcg (0.03 %) nasal spray, SPRAY 2 SPRAYS INTO EACH NOSTRIL 3 TIMES A DAY AS NEEDED, Disp: , Rfl:     LORazepam (Ativan) 0.5 mg tablet, Take by mouth., Disp: , Rfl:     methocarbamol (Robaxin) 500 mg tablet, 2 tablet EVERY 8 HOURS (route: oral), Disp: , Rfl:     ondansetron (Zofran) 4 mg tablet, 1 tablet EVERY 8 HOURS (route: oral), Disp: , Rfl:     rizatriptan (Maxalt) 10 mg tablet, TAKE 1 TABLET AT ONSET OF HEADACHE MAY REPEAT EVERY 2 HOURS AS NEEDED. MAX 3 TABS/24 HRS, Disp: , Rfl:     rosuvastatin (Crestor) 5 mg tablet, Take 1 tablet (5 mg) by mouth once daily., Disp: 90 tablet, Rfl: 3    sertraline (Zoloft) 100 mg tablet, Take 2 tablets (200 mg) by mouth once daily., Disp: 180 tablet, Rfl: 0    sodium chloride 0.9% (Normal Saline Flush) flush, 10 mL EVERY 8 HOURS (route: injection), Disp: , Rfl:     SUMAtriptan (Imitrex) 50 mg tablet, TAKE 1 TABLET FOR HEADACHE RELIEF. MAY REPEAT EVERY 2 HOURS. MAX 200MG/DAY., Disp: , Rfl:     tetrahydrozoline-zinc (Visine-AC) 0.05-0.25 % ophthalmic solution, Administer into affected eye(s) twice a day.,  Disp: , Rfl:     traZODone (Desyrel) 50 mg tablet, 1-2 tablets daily at bedtime as needed for sleep, Disp: 180 tablet, Rfl: 0    valsartan (Diovan) 80 mg tablet, Take 1 tablet (80 mg) by mouth once daily., Disp: 90 tablet, Rfl: 1    verapamil (Calan) 80 mg tablet, TAKE 2 TABLETS BY MOUTH TWICE A DAY AS DIRECTED, Disp: 360 tablet, Rfl: 1    Vitamin B-2 100 mg tablet tablet, Take 2 tablets (200 mg) by mouth 2 times a day., Disp: , Rfl:     white petrolatum-mineral oiL 94-3 % ophthalmic ointment, Apply to affected eye(s)., Disp: , Rfl:   Medical History:  Past Medical History:   Diagnosis Date    Personal history of other diseases of the nervous system and sense organs     History of hearing loss    Personal history of other specified conditions     History of snoring    Unspecified nonsuppurative otitis media, right ear 10/21/2020    Fluid level behind tympanic membrane of right ear     Surgical History:  Past Surgical History:   Procedure Laterality Date    OTHER SURGICAL HISTORY  08/19/2020    Shoulder surgery    OTHER SURGICAL HISTORY  01/20/2022    Craniotomy     Family History:  Family History   Problem Relation Name Age of Onset    No Known Problems Mother      Hypotension Other       Social History:  Social History     Socioeconomic History    Marital status:      Spouse name: Not on file    Number of children: Not on file    Years of education: Not on file    Highest education level: Not on file   Occupational History    Not on file   Tobacco Use    Smoking status: Never     Passive exposure: Never    Smokeless tobacco: Never   Vaping Use    Vaping status: Never Used   Substance and Sexual Activity    Alcohol use: Yes     Alcohol/week: 2.0 standard drinks of alcohol     Types: 2 Cans of beer per week    Drug use: Never    Sexual activity: Not on file   Other Topics Concern    Not on file   Social History Narrative    Not on file     Social Determinants of Health     Financial Resource Strain: Not on  file   Food Insecurity: Not on file   Transportation Needs: Not on file   Physical Activity: Not on file   Stress: Not on file   Social Connections: Not on file   Intimate Partner Violence: Not on file   Housing Stability: Not on file     Vitals:  There were no vitals filed for this visit.    Padmini Gerard, APRN-CNS

## 2024-10-07 ENCOUNTER — HOSPITAL ENCOUNTER (OUTPATIENT)
Dept: RADIOLOGY | Facility: HOSPITAL | Age: 59
Discharge: HOME | End: 2024-10-07
Payer: MEDICARE

## 2024-10-07 DIAGNOSIS — D33.3 ACOUSTIC NEUROMA (MULTI): ICD-10-CM

## 2024-10-07 PROCEDURE — 70551 MRI BRAIN STEM W/O DYE: CPT

## 2024-10-07 PROCEDURE — 70551 MRI BRAIN STEM W/O DYE: CPT | Performed by: RADIOLOGY

## 2024-10-14 ENCOUNTER — APPOINTMENT (OUTPATIENT)
Dept: GASTROENTEROLOGY | Facility: EXTERNAL LOCATION | Age: 59
End: 2024-10-14
Payer: MEDICARE

## 2024-10-17 ENCOUNTER — TELEPHONE (OUTPATIENT)
Dept: OTOLARYNGOLOGY | Facility: CLINIC | Age: 59
End: 2024-10-17
Payer: COMMERCIAL

## 2024-10-17 NOTE — TELEPHONE ENCOUNTER
Attempted to call patient regarding MRI results. Left voicemail for patient with results as well as how to schedule a follow up visit with a hearing test. No other needs at this time.     ----- Message from Margi Manrique sent at 10/9/2024  7:04 PM EDT -----  MRI is stable. Repeat in 18 mo.

## 2024-10-24 ENCOUNTER — OFFICE VISIT (OUTPATIENT)
Dept: NEUROLOGY | Facility: CLINIC | Age: 59
End: 2024-10-24
Payer: MEDICARE

## 2024-10-24 VITALS
BODY MASS INDEX: 33.13 KG/M2 | HEART RATE: 80 BPM | SYSTOLIC BLOOD PRESSURE: 142 MMHG | HEIGHT: 74 IN | DIASTOLIC BLOOD PRESSURE: 89 MMHG

## 2024-10-24 DIAGNOSIS — R26.89 IMBALANCE: ICD-10-CM

## 2024-10-24 DIAGNOSIS — G44.89 CHRONIC MIXED HEADACHE SYNDROME: Primary | ICD-10-CM

## 2024-10-24 DIAGNOSIS — R51.9 HEADACHE, UNSPECIFIED: ICD-10-CM

## 2024-10-24 DIAGNOSIS — R51.9 PERSISTENT HEADACHES: ICD-10-CM

## 2024-10-24 DIAGNOSIS — G31.84 MILD COGNITIVE IMPAIRMENT: ICD-10-CM

## 2024-10-24 PROCEDURE — 3077F SYST BP >= 140 MM HG: CPT | Performed by: PSYCHIATRY & NEUROLOGY

## 2024-10-24 PROCEDURE — 99214 OFFICE O/P EST MOD 30 MIN: CPT | Performed by: PSYCHIATRY & NEUROLOGY

## 2024-10-24 PROCEDURE — 3079F DIAST BP 80-89 MM HG: CPT | Performed by: PSYCHIATRY & NEUROLOGY

## 2024-10-24 PROCEDURE — 1036F TOBACCO NON-USER: CPT | Performed by: PSYCHIATRY & NEUROLOGY

## 2024-10-24 RX ORDER — SUMATRIPTAN 50 MG/1
50 TABLET, FILM COATED ORAL ONCE AS NEEDED
Qty: 9 TABLET | Refills: 6 | Status: SHIPPED | OUTPATIENT
Start: 2024-10-24

## 2024-10-24 RX ORDER — ACETAZOLAMIDE 125 MG/1
125 TABLET ORAL 2 TIMES DAILY
Qty: 180 TABLET | Refills: 2 | Status: SHIPPED | OUTPATIENT
Start: 2024-10-24 | End: 2025-07-21

## 2024-10-24 RX ORDER — VERAPAMIL HYDROCHLORIDE 80 MG/1
160 TABLET ORAL 2 TIMES DAILY
Qty: 360 TABLET | Refills: 3 | Status: SHIPPED | OUTPATIENT
Start: 2024-10-24

## 2024-10-24 NOTE — PROGRESS NOTES
Subjective     Andrey Drake is a 59 y.o. year old male seen in follow-up for persistent headache, imbalance and cognitive complaints status post acoustic neuroma surgery in January 2022 and subsequent CSF leak.    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     I evaluated him initially on 4/4/2022. As detailed in my ambulatory EMR note from that date, after presenting to ENT for hearing loss evaluation in August 2021 he was sent for IAC protocol MRI which demonstrated a 5 x 10 mm enhancing mass in the right IAC compatible with acoustic neuroma. He was seen by otology and neurosurgery and underwent translabyrinthine transmastoid approach resection on 1/10/2022.     Postoperative course was complicated by wound leakage concerning for CSF leak for which he presented to the ED on 1/25, after which he developed altered mental status, fevers and tachycardia. He was treated for bacterial and fungal meningitis. ID service was consulted.      Ever since then he has noted persistent headaches, somewhat moderated by the time he saw me but still on a daily pattern. He additionally noted persistent photophobia. He also has noted cognitive issues including reduction in verbal fluency. He has additionally been bothered by insomnia and fatigue.     I reviewed a contrasted brain MRI from 1/11/2022 showing status post right translabyrinthine tumor resection with small amount of residual enhancing tumor anteriorly. I reviewed a head CT from 2/15/2022 which appeared unremarkable.     I checked B12 and TSH levels which were normal. We discussed consideration of neuropsychological testing. I considered a  tricyclic antidepressant for his headaches and insomnia, but there was potential for interaction with fluconazole. Accordingly I recommended cyproheptadine.     I evaluated him again on 7/19/2022, by A/V visit. At that time he was taking cyproheptadine with some improvement noted in headaches but significant weight gain. I advised stopping cyproheptadine. I advised increasing verapamil from 80 mg twice daily to 80 mg a.m. and 160 mg p.m.     I evaluated him again on 8/16/2022, again by A/V visit. He remained in a daily headache pattern and endorsed persistent photophobia. He noted mild improvement with verapamil titration and I recommended increasing to 160 mg twice daily but first checking an EKG. He continued in vestibular therapy and continued to feel somewhat off balance. I reviewed the disability form with him.     I evaluated him again on 1/6/2023 in the office. He remained in a daily headache pattern at that time. He had noted only modest improvement in headaches with verapamil titration but we discussed further titration to 160 mg twice daily after checking an EKG. I was hesitant to recommend topiramate given his speech/cognitive issues. I referred him to [facility] for a headache subspecialty opinion and further management.     I evaluated him again on 9/11/2023 at which visit he indicated being started on Ajovy and subsequent improvement in headache pattern.  I recommended he continue Ajovy and verapamil.  Given that he was still noting headaches once or twice a week and ongoing sensitivity to lights and noises as well as nausea, I recommended adding riboflavin 400 mg daily as a nutraceutical.  I recommended proceeding with formal neuropsychological testing given his ongoing cognitive concerns.    I evaluated him most recently on 3/27/2024.  He endorsed worsening headache pattern since lapsing off Ajovy due to financial considerations.  He was anticipating starting on Aimovig in a couple of months after an  insurance switch.  We discussed adding acetazolamide 125 mg twice daily to see if this would help with his headaches and sensitivity to visual stimuli.  He denied history of kidney stones.  I provided another referral to neuropsychology as he had had logistical difficulty getting scheduled.    He is evaluated again today in the office, accompanied by his wife.    He remains in a daily headache pattern.  He feels that at least initially the addition of acetazolamide 125 mg twice daily reduced headache intensity 2 on average 4/10 although he still has frequent headaches of migrainous intensity, sometimes persisting for up to 72 hours.  He very rarely has a headache free day.    In addition to acetazolamide he continues on verapamil 160 mg twice daily.  He takes sumatriptan 50 mg abortively on rare occasions but for the most part just resorts to acetaminophen.    He is constantly photophobic.  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    He has not resumed driving and we discussed this.  His wife is very concerned about his safety as a  and he indicates that he is uneasy with cars moving close to him.  He is also very light sensitive and this includes headlights and sunlight.    We reviewed the results of his neuropsychological assessment which was done by Dr. Leana Ledezma on 5/30.  Impression was MCI with predominantly executive dysfunction including executive aspects of language and learning/memory.  There was felt to be a significant anxiety component and he has been seeing Padmini Pacer regarding this.  He is on sertraline which she feels has helped to some extent, smoothing out the highs and lows.    He fatigues very easily and this limits his work endurance.    He continues to feel a bit off balance and is using a single-point cane  today.  He reports an occasional fall without injury.       Review of Systems    As per the history of present illness    Patient Active Problem List   Diagnosis    Major depression    Depression    Crocodile tears syndrome    Cognitive dysfunction    Chronic tonsillitis    Chronic otitis media with effusion    Chronic mastoiditis of right side    Attention deficit disorder    Asymmetric tonsils    Left-sided sensorineural hearing loss    Asymmetric SNHL (sensorineural hearing loss)    Anxiety reaction    Acoustic neuroma (Multi)    Other long term (current) drug therapy    Nausea and/or vomiting    Mixed hearing loss of right ear    Meningitis due to gram-negative bacteria (HHS-HCC)    Meningitis (HHS-HCC)    Lumbago    Insomnia    Infection associated with neurological device (CMS-HCC)    Imbalance    Hypokalemia    Fungal meningitis (HHS-HCC)    Expressive aphasia    Dryness of right eye due to decreased tear production    Vertigo    Dizziness    Other cranial cerebrospinal fluid leak    Hyperlipidemia    Encounter for adjustment and management of vascular access device    Other abnormalities of gait and mobility    Oth bacterial agents as the cause of diseases classd elswhr    Unsteady gait    Tinnitus, right    Thrush, oral    Sensorineural hearing loss (SNHL) of left ear with restricted hearing of right ear    Rhinorrhea    Retraction of tympanic membrane of left ear    Pressure sensation in right ear    Persistent headaches    Persistent cough for 3 weeks or longer    Otitis externa    Long term (current) use of antibiotics    Facial weakness    Primary hypertension    Class 1 obesity due to excess calories with serious comorbidity and body mass index (BMI) of 34.0 to 34.9 in adult    Accidental fall    Acute thoracic back pain    Common migraine with intractable migraine    Erectile dysfunction    Exposure to severe acute respiratory syndrome coronavirus 2 (SARS-CoV-2)    Fatigue    History of acoustic  neuroma    History of hearing loss    Infarction of testicle    Numbness and tingling sensation of skin    Pain of joint of both hands    Persistent pain in testicle    Spermatic cord inflammation    Weight gain    Encounter for weight loss counseling    Agatston coronary artery calcium score less than 100    Health maintenance examination    Injury of tendon of foot    Foot injury, left, initial encounter    Left foot pain    Chronically elevated hemidiaphragm    Hypoxia    Dyspnea on exertion    Prostate cancer screening    Low vitamin B12 level    Colon cancer screening    Need for influenza vaccination     Past Medical History:   Diagnosis Date    Personal history of other diseases of the nervous system and sense organs     History of hearing loss    Personal history of other specified conditions     History of snoring    Unspecified nonsuppurative otitis media, right ear 10/21/2020    Fluid level behind tympanic membrane of right ear     Past Surgical History:   Procedure Laterality Date    OTHER SURGICAL HISTORY  08/19/2020    Shoulder surgery    OTHER SURGICAL HISTORY  01/20/2022    Craniotomy     Social History     Tobacco Use    Smoking status: Never     Passive exposure: Never    Smokeless tobacco: Never   Substance Use Topics    Alcohol use: Yes     Alcohol/week: 2.0 standard drinks of alcohol     Types: 2 Cans of beer per week     family history includes Hypotension in an other family member; No Known Problems in his mother.    Current Outpatient Medications:     acetaminophen (TylenoL) 325 mg capsule, Take by mouth., Disp: , Rfl:     acetaZOLAMIDE (Diamox) 125 mg tablet, TAKE 1 TABLET (125 MG) BY MOUTH 2 TIMES A DAY., Disp: 180 tablet, Rfl: 1    ipratropium (Atrovent) 21 mcg (0.03 %) nasal spray, SPRAY 2 SPRAYS INTO EACH NOSTRIL 3 TIMES A DAY AS NEEDED, Disp: , Rfl:     LORazepam (Ativan) 0.5 mg tablet, Take by mouth., Disp: , Rfl:     methocarbamol (Robaxin) 500 mg tablet, 2 tablet EVERY 8 HOURS  (route: oral), Disp: , Rfl:     ondansetron (Zofran) 4 mg tablet, 1 tablet EVERY 8 HOURS (route: oral), Disp: , Rfl:     rizatriptan (Maxalt) 10 mg tablet, TAKE 1 TABLET AT ONSET OF HEADACHE MAY REPEAT EVERY 2 HOURS AS NEEDED. MAX 3 TABS/24 HRS, Disp: , Rfl:     rosuvastatin (Crestor) 5 mg tablet, Take 1 tablet (5 mg) by mouth once daily., Disp: 90 tablet, Rfl: 3    sertraline (Zoloft) 100 mg tablet, Take 2 tablets (200 mg) by mouth once daily., Disp: 180 tablet, Rfl: 0    sodium chloride 0.9% (Normal Saline Flush) flush, 10 mL EVERY 8 HOURS (route: injection), Disp: , Rfl:     SUMAtriptan (Imitrex) 50 mg tablet, TAKE 1 TABLET FOR HEADACHE RELIEF. MAY REPEAT EVERY 2 HOURS. MAX 200MG/DAY., Disp: , Rfl:     tetrahydrozoline-zinc (Visine-AC) 0.05-0.25 % ophthalmic solution, Administer into affected eye(s) twice a day., Disp: , Rfl:     traZODone (Desyrel) 50 mg tablet, 1-2 tablets daily at bedtime as needed for sleep, Disp: 180 tablet, Rfl: 0    valsartan (Diovan) 80 mg tablet, Take 1 tablet (80 mg) by mouth once daily., Disp: 90 tablet, Rfl: 1    verapamil (Calan) 80 mg tablet, TAKE 2 TABLETS BY MOUTH TWICE A DAY AS DIRECTED, Disp: 360 tablet, Rfl: 1    Vitamin B-2 100 mg tablet tablet, Take 2 tablets (200 mg) by mouth 2 times a day., Disp: , Rfl:     white petrolatum-mineral oiL 94-3 % ophthalmic ointment, Apply to affected eye(s)., Disp: , Rfl:   Allergies   Allergen Reactions    Other Other       Objective   Neurological Exam  Physical Exam    Physical Examination:    General: Alert man who was ambulatory with a single-point cane.    Mental Status: Clear sensorium without fluctuation.  Pleasant and appropriate in conversation.  Fluent unremarkable speech without paraphasic errors.  He followed instructions accurately and promptly on exam without bradyphrenia.    Cranial Nerves:  Funduscopic exam was not well visualized bilaterally on nondilated exam.  Pupils were equal, round and reactive to light with no relative  afferent pupillary defect.  Extraocular movements were intact and conjugate without nystagmus.  No ptosis.  Visual fields were full to confrontation tested binocularly.   Facial motor function was symmetrically intact.  Hearing was grossly intact for purposes of conversation.  No dysarthria.  Shoulder shrug was symmetric.  Tongue protrusion was midline.    Motor: Muscle tone was normal throughout.  There was no pronator drift or asymmetry of finger taps.  Confrontation strength was symmetrically 5/5 throughout.    Coordination: He again exhibited frequent multidirectional head movements somewhere between chorea and titubation, but suppressed with distraction and also suppressed during eye exam.  No choreiform movements of limbs.  Intact finger to finger without dysmetria or intention tremor.  No rest tremor or myoclonus.    Gait: Stable observed with cane and notable for a slight hitch at the hip suggestive of right longer than left leg length discrepancy.  He moved at a moderately rapid pace and with relatively minimal reliance on the cane..      Assessment/Plan     I reviewed the impressions and recommendations of the neuropsychological assessment.  I advised him to continue working with psychiatry on anxiety management.    We discussed headache management.  He remains in a daily pattern and frequently the headaches achieve migrainous intensity.  I provided refills for his preventive regimen but I am referring him to my colleague Dr. Omar Covington, headache subspecialist, for further management.  His wife asked about interventions such as botulinum toxin which might be an option.    We discussed that his photophobia is likely to be a facet of his headache disorder.    After our discussion he indicated he is not likely to try resuming driving.  We discussed that in light of his observations of being disconcerted by other cars close to his vehicle as well as his photophobia with difficulty tolerating bright sunlight  or headlights, it is not practical for him to resume driving at this point.  This may change in the future.

## 2024-10-24 NOTE — PATIENT INSTRUCTIONS
We reviewed the findings and recommendations of your neuropsychological testing panel.  Please continue working with your therapist on anxiety management.    We reviewed management of your headaches and photophobia.  I am referring you to my colleague Dr. Omar Covington who is a headache subspecialist, and you can request an appointment for the [place] office which will be geographically convenient.  I sent refills of your headache medications to your [facility].

## 2024-11-11 DIAGNOSIS — E78.2 MIXED HYPERLIPIDEMIA: ICD-10-CM

## 2024-11-12 ENCOUNTER — APPOINTMENT (OUTPATIENT)
Dept: BEHAVIORAL HEALTH | Facility: CLINIC | Age: 59
End: 2024-11-12
Payer: MEDICARE

## 2024-11-12 DIAGNOSIS — F33.1 DEPRESSION, MAJOR, RECURRENT, MODERATE: ICD-10-CM

## 2024-11-12 DIAGNOSIS — G47.00 INSOMNIA, UNSPECIFIED TYPE: ICD-10-CM

## 2024-11-12 DIAGNOSIS — F41.1 GAD (GENERALIZED ANXIETY DISORDER): ICD-10-CM

## 2024-11-12 PROCEDURE — 99213 OFFICE O/P EST LOW 20 MIN: CPT | Performed by: CLINICAL NURSE SPECIALIST

## 2024-11-12 RX ORDER — SERTRALINE HYDROCHLORIDE 100 MG/1
200 TABLET, FILM COATED ORAL DAILY
Qty: 180 TABLET | Refills: 1 | Status: SHIPPED | OUTPATIENT
Start: 2024-11-12 | End: 2025-02-10

## 2024-11-12 RX ORDER — TRAZODONE HYDROCHLORIDE 50 MG/1
TABLET ORAL
Qty: 180 TABLET | Refills: 0 | Status: SHIPPED | OUTPATIENT
Start: 2024-11-12

## 2024-11-12 NOTE — PROGRESS NOTES
Outpatient Psychiatry      Subjective   Andrey Drake, a 59 y.o. male, presents as an established patient for an outpatient appointment /medication management in psychiatry  for an audio and video virtual  appointment , he was at home for the appointment     Virtual or Telephone Consent     An interactive audio and video telecommunication system which permits real time communications between the patient (at the originating site) and provider (at the distant site) was utilized to provide this telehealth service.   Verbal consent was requested and obtained from Andrey Drake on this date, 11/12/24  for a telehealth visit.       Diagnosis: ·      RONDA (generalized anxiety disorder) (300.02) (F41.1) moderate    · Insomnia unspecified type mild G47.00   · Depression, major recurrent ,moderate ( primary diagnoses) F 33.1     Treatment Plan/Recommendations:   Follow-up plan  was discussed with patient.  can follow up in 4-6 weeks , can continue self care and wellness efforts and maintain routine health screenings can call  for treatment and scheduling concerns  Psychotropic medications :  Continue sertraline 100 mg 2 tablets each morning for depression and anxiety    Continue trazodone 50 mg , daily each night as needed at bedtime for sleep / insomnia       HPI:  reviewed the notes in the [facility] emr from other medical providers    mood remains depressed , he spoke about the factors for this   isolates at times to himself sometimes ruminates on conversations and social events when he wonders what others think of him   Everyday tasks are difficult   At night gets ringing in his ears , he goes over conversations in his mind repeatedly in a ruminative manner   has not been sleeping well   there is no indication of issues with mood cycling   no psychoses   he actively participated in discussion   affect is congruent , depressed   no panic attacks recently   no thoughts of harming himself or others   denies  issues or history of substance abuse  is tolerating sertraline   no psychoses      Psych ros and medical ros as noted above          Patient Active Problem List   Diagnosis    Major depression    Depression    Crocodile tears syndrome    Cognitive dysfunction    Chronic tonsillitis    Chronic otitis media with effusion    Chronic mastoiditis of right side    Attention deficit disorder    Asymmetric tonsils    Left-sided sensorineural hearing loss    Asymmetric SNHL (sensorineural hearing loss)    Anxiety reaction    Acoustic neuroma (Multi)    Other long term (current) drug therapy    Nausea and/or vomiting    Mixed hearing loss of right ear    Meningitis due to gram-negative bacteria (HHS-HCC)    Meningitis (HHS-HCC)    Lumbago    Insomnia    Infection associated with neurological device (CMS-HCC)    Imbalance    Hypokalemia    Fungal meningitis ([facility])    Expressive aphasia    Dryness of right eye due to decreased tear production    Vertigo    Dizziness    Other cranial cerebrospinal fluid leak    Hyperlipidemia    Encounter for adjustment and management of vascular access device    Other abnormalities of gait and mobility    Oth bacterial agents as the cause of diseases classd elswhr    Unsteady gait    Tinnitus, right    Thrush, oral    Sensorineural hearing loss (SNHL) of left ear with restricted hearing of right ear    Rhinorrhea    Retraction of tympanic membrane of left ear    Pressure sensation in right ear    Persistent headaches    Persistent cough for 3 weeks or longer    Otitis externa    Long term (current) use of antibiotics    Facial weakness    Primary hypertension    Class 1 obesity due to excess calories with serious comorbidity and body mass index (BMI) of 34.0 to 34.9 in adult    Accidental fall    Acute thoracic back pain    Common migraine with intractable migraine    Erectile dysfunction    Exposure to severe acute respiratory syndrome coronavirus 2 (SARS-CoV-2)    Fatigue    History of  acoustic neuroma    History of hearing loss    Infarction of testicle    Numbness and tingling sensation of skin    Pain of joint of both hands    Persistent pain in testicle    Spermatic cord inflammation    Weight gain    Encounter for weight loss counseling    Agatston coronary artery calcium score less than 100    Health maintenance examination    Injury of tendon of foot    Foot injury, left, initial encounter    Left foot pain    Chronically elevated hemidiaphragm    Hypoxia    Dyspnea on exertion    Prostate cancer screening    Low vitamin B12 level    Colon cancer screening    Need for influenza vaccination     Current Outpatient Medications:     acetaminophen (TylenoL) 325 mg capsule, Take by mouth., Disp: , Rfl:     acetaZOLAMIDE (Diamox) 125 mg tablet, Take 1 tablet (125 mg) by mouth 2 times a day., Disp: 180 tablet, Rfl: 2    ipratropium (Atrovent) 21 mcg (0.03 %) nasal spray, SPRAY 2 SPRAYS INTO EACH NOSTRIL 3 TIMES A DAY AS NEEDED, Disp: , Rfl:     LORazepam (Ativan) 0.5 mg tablet, Take by mouth., Disp: , Rfl:     methocarbamol (Robaxin) 500 mg tablet, 2 tablet EVERY 8 HOURS (route: oral), Disp: , Rfl:     ondansetron (Zofran) 4 mg tablet, 1 tablet EVERY 8 HOURS (route: oral), Disp: , Rfl:     rizatriptan (Maxalt) 10 mg tablet, TAKE 1 TABLET AT ONSET OF HEADACHE MAY REPEAT EVERY 2 HOURS AS NEEDED. MAX 3 TABS/24 HRS, Disp: , Rfl:     rosuvastatin (Crestor) 5 mg tablet, Take 1 tablet (5 mg) by mouth once daily., Disp: 90 tablet, Rfl: 3    sertraline (Zoloft) 100 mg tablet, Take 2 tablets (200 mg) by mouth once daily., Disp: 180 tablet, Rfl: 0    sodium chloride 0.9% (Normal Saline Flush) flush, 10 mL EVERY 8 HOURS (route: injection), Disp: , Rfl:     SUMAtriptan (Imitrex) 50 mg tablet, Take 1 tablet (50 mg) by mouth 1 time if needed for migraine. May repeat dose once in 2 hours if no relief.  Do not exceed 2 doses in 24 hours., Disp: 9 tablet, Rfl: 6    tetrahydrozoline-zinc (Visine-AC) 0.05-0.25 %  ophthalmic solution, Administer into affected eye(s) twice a day., Disp: , Rfl:     traZODone (Desyrel) 50 mg tablet, 1-2 tablets daily at bedtime as needed for sleep, Disp: 180 tablet, Rfl: 0    valsartan (Diovan) 80 mg tablet, Take 1 tablet (80 mg) by mouth once daily., Disp: 90 tablet, Rfl: 1    verapamil (Calan) 80 mg tablet, Take 2 tablets (160 mg) by mouth 2 times a day., Disp: 360 tablet, Rfl: 3    Vitamin B-2 100 mg tablet tablet, Take 2 tablets (200 mg) by mouth 2 times a day., Disp: , Rfl:     white petrolatum-mineral oiL 94-3 % ophthalmic ointment, Apply to affected eye(s)., Disp: , Rfl:   Medical History:  Past Medical History:   Diagnosis Date    Personal history of other diseases of the nervous system and sense organs     History of hearing loss    Personal history of other specified conditions     History of snoring    Unspecified nonsuppurative otitis media, right ear 10/21/2020    Fluid level behind tympanic membrane of right ear     Surgical History:  Past Surgical History:   Procedure Laterality Date    OTHER SURGICAL HISTORY  08/19/2020    Shoulder surgery    OTHER SURGICAL HISTORY  01/20/2022    Craniotomy     Family History:  Family History   Problem Relation Name Age of Onset    No Known Problems Mother      Hypotension Other       Social History:  Social History     Socioeconomic History    Marital status:      Spouse name: Not on file    Number of children: Not on file    Years of education: Not on file    Highest education level: Not on file   Occupational History    Not on file   Tobacco Use    Smoking status: Never     Passive exposure: Never    Smokeless tobacco: Never   Vaping Use    Vaping status: Never Used   Substance and Sexual Activity    Alcohol use: Yes     Alcohol/week: 2.0 standard drinks of alcohol     Types: 2 Cans of beer per week    Drug use: Never    Sexual activity: Not on file   Other Topics Concern    Not on file   Social History Narrative    Not on file      Social Drivers of Health     Financial Resource Strain: Not on file   Food Insecurity: Not on file   Transportation Needs: Not on file   Physical Activity: Not on file   Stress: Not on file   Social Connections: Not on file   Intimate Partner Violence: Not on file   Housing Stability: Not on file     Vitals:  There were no vitals filed for this visit.    Padmini Gerard, APRN-CNS

## 2024-11-15 RX ORDER — ROSUVASTATIN CALCIUM 5 MG/1
5 TABLET, COATED ORAL DAILY
Qty: 90 TABLET | Refills: 3 | Status: SHIPPED | OUTPATIENT
Start: 2024-11-15 | End: 2025-11-15

## 2024-11-20 ENCOUNTER — TELEPHONE (OUTPATIENT)
Dept: NEUROLOGY | Facility: CLINIC | Age: 59
End: 2024-11-20
Payer: MEDICARE

## 2024-11-20 NOTE — TELEPHONE ENCOUNTER
Patient came into office today and is requesting psychological testing/memory/cognitive testing paperwork. Patient needs the results and notes for insurance. The sooner the better, patient needs this paperwork before 12/4/2024.

## 2024-11-27 ENCOUNTER — APPOINTMENT (OUTPATIENT)
Dept: NEUROLOGY | Facility: CLINIC | Age: 59
End: 2024-11-27
Payer: COMMERCIAL

## 2024-12-03 ENCOUNTER — ANESTHESIA EVENT (OUTPATIENT)
Dept: GASTROENTEROLOGY | Facility: HOSPITAL | Age: 59
End: 2024-12-03
Payer: MEDICARE

## 2024-12-03 ENCOUNTER — HOSPITAL ENCOUNTER (OUTPATIENT)
Dept: GASTROENTEROLOGY | Facility: HOSPITAL | Age: 59
Discharge: HOME | End: 2024-12-03
Payer: MEDICARE

## 2024-12-03 ENCOUNTER — ANESTHESIA (OUTPATIENT)
Dept: GASTROENTEROLOGY | Facility: HOSPITAL | Age: 59
End: 2024-12-03
Payer: MEDICARE

## 2024-12-03 VITALS
TEMPERATURE: 97.2 F | HEIGHT: 74 IN | BODY MASS INDEX: 30.54 KG/M2 | WEIGHT: 238 LBS | HEART RATE: 69 BPM | RESPIRATION RATE: 18 BRPM | OXYGEN SATURATION: 99 % | SYSTOLIC BLOOD PRESSURE: 141 MMHG | DIASTOLIC BLOOD PRESSURE: 86 MMHG

## 2024-12-03 DIAGNOSIS — Z12.11 COLON CANCER SCREENING: Primary | ICD-10-CM

## 2024-12-03 PROCEDURE — 7100000010 HC PHASE TWO TIME - EACH INCREMENTAL 1 MINUTE

## 2024-12-03 PROCEDURE — 45380 COLONOSCOPY AND BIOPSY: CPT | Performed by: INTERNAL MEDICINE

## 2024-12-03 PROCEDURE — 2500000001 HC RX 250 WO HCPCS SELF ADMINISTERED DRUGS (ALT 637 FOR MEDICARE OP): Performed by: INTERNAL MEDICINE

## 2024-12-03 PROCEDURE — 3700000001 HC GENERAL ANESTHESIA TIME - INITIAL BASE CHARGE

## 2024-12-03 PROCEDURE — 2500000004 HC RX 250 GENERAL PHARMACY W/ HCPCS (ALT 636 FOR OP/ED): Performed by: ANESTHESIOLOGIST ASSISTANT

## 2024-12-03 PROCEDURE — 7100000009 HC PHASE TWO TIME - INITIAL BASE CHARGE

## 2024-12-03 PROCEDURE — 45385 COLONOSCOPY W/LESION REMOVAL: CPT | Performed by: INTERNAL MEDICINE

## 2024-12-03 PROCEDURE — 3700000002 HC GENERAL ANESTHESIA TIME - EACH INCREMENTAL 1 MINUTE

## 2024-12-03 RX ORDER — OXYCODONE HYDROCHLORIDE 10 MG/1
10 TABLET ORAL EVERY 4 HOURS PRN
OUTPATIENT
Start: 2024-12-03

## 2024-12-03 RX ORDER — PROPOFOL 10 MG/ML
INJECTION, EMULSION INTRAVENOUS AS NEEDED
Status: DISCONTINUED | OUTPATIENT
Start: 2024-12-03 | End: 2024-12-03

## 2024-12-03 RX ORDER — FENTANYL CITRATE 50 UG/ML
12.5 INJECTION, SOLUTION INTRAMUSCULAR; INTRAVENOUS EVERY 5 MIN PRN
OUTPATIENT
Start: 2024-12-03

## 2024-12-03 RX ORDER — ONDANSETRON HYDROCHLORIDE 2 MG/ML
4 INJECTION, SOLUTION INTRAVENOUS ONCE AS NEEDED
OUTPATIENT
Start: 2024-12-03

## 2024-12-03 RX ORDER — ALBUTEROL SULFATE 0.83 MG/ML
2.5 SOLUTION RESPIRATORY (INHALATION) ONCE AS NEEDED
OUTPATIENT
Start: 2024-12-03

## 2024-12-03 RX ORDER — HYDROMORPHONE HYDROCHLORIDE 1 MG/ML
1 INJECTION, SOLUTION INTRAMUSCULAR; INTRAVENOUS; SUBCUTANEOUS EVERY 5 MIN PRN
OUTPATIENT
Start: 2024-12-03

## 2024-12-03 RX ORDER — MIDAZOLAM HYDROCHLORIDE 1 MG/ML
1 INJECTION, SOLUTION INTRAMUSCULAR; INTRAVENOUS ONCE AS NEEDED
OUTPATIENT
Start: 2024-12-03

## 2024-12-03 RX ORDER — ACETAMINOPHEN 325 MG/1
975 TABLET ORAL ONCE
OUTPATIENT
Start: 2024-12-03 | End: 2024-12-03

## 2024-12-03 RX ORDER — DEXTROMETHORPHAN/PSEUDOEPHED 2.5-7.5/.8
DROPS ORAL AS NEEDED
Status: DISCONTINUED | OUTPATIENT
Start: 2024-12-03 | End: 2024-12-04 | Stop reason: HOSPADM

## 2024-12-03 RX ORDER — MEPERIDINE HYDROCHLORIDE 50 MG/ML
12.5 INJECTION INTRAMUSCULAR; INTRAVENOUS; SUBCUTANEOUS EVERY 10 MIN PRN
OUTPATIENT
Start: 2024-12-03

## 2024-12-03 RX ORDER — DIPHENHYDRAMINE HYDROCHLORIDE 50 MG/ML
12.5 INJECTION INTRAMUSCULAR; INTRAVENOUS ONCE AS NEEDED
OUTPATIENT
Start: 2024-12-03

## 2024-12-03 RX ORDER — LIDOCAINE HYDROCHLORIDE 20 MG/ML
INJECTION, SOLUTION EPIDURAL; INFILTRATION; INTRACAUDAL; PERINEURAL AS NEEDED
Status: DISCONTINUED | OUTPATIENT
Start: 2024-12-03 | End: 2024-12-03

## 2024-12-03 RX ORDER — SODIUM CHLORIDE, SODIUM LACTATE, POTASSIUM CHLORIDE, CALCIUM CHLORIDE 600; 310; 30; 20 MG/100ML; MG/100ML; MG/100ML; MG/100ML
100 INJECTION, SOLUTION INTRAVENOUS CONTINUOUS
OUTPATIENT
Start: 2024-12-03 | End: 2024-12-04

## 2024-12-03 RX ORDER — HYDRALAZINE HYDROCHLORIDE 20 MG/ML
5 INJECTION INTRAMUSCULAR; INTRAVENOUS EVERY 30 MIN PRN
OUTPATIENT
Start: 2024-12-03

## 2024-12-03 RX ORDER — LIDOCAINE HYDROCHLORIDE 10 MG/ML
0.1 INJECTION, SOLUTION INFILTRATION; PERINEURAL ONCE
OUTPATIENT
Start: 2024-12-03 | End: 2024-12-03

## 2024-12-03 ASSESSMENT — ENCOUNTER SYMPTOMS
COUGH: 0
TROUBLE SWALLOWING: 0
VOMITING: 0
SLEEP DISTURBANCE: 0
ARTHRALGIAS: 0
ABDOMINAL PAIN: 0
NAUSEA: 0
DIARRHEA: 0
CHILLS: 0
JOINT SWELLING: 0
DIZZINESS: 0
DIFFICULTY URINATING: 0
LIGHT-HEADEDNESS: 0
FEVER: 0
WHEEZING: 0
UNEXPECTED WEIGHT CHANGE: 0
HEADACHES: 0
ABDOMINAL DISTENTION: 0
SHORTNESS OF BREATH: 0
CONSTIPATION: 0
COLOR CHANGE: 0
SPEECH DIFFICULTY: 0
CONFUSION: 0

## 2024-12-03 ASSESSMENT — PAIN - FUNCTIONAL ASSESSMENT
PAIN_FUNCTIONAL_ASSESSMENT: 0-10

## 2024-12-03 ASSESSMENT — PAIN SCALES - GENERAL
PAINLEVEL_OUTOF10: 0 - NO PAIN

## 2024-12-03 ASSESSMENT — COLUMBIA-SUICIDE SEVERITY RATING SCALE - C-SSRS
6. HAVE YOU EVER DONE ANYTHING, STARTED TO DO ANYTHING, OR PREPARED TO DO ANYTHING TO END YOUR LIFE?: NO
2. HAVE YOU ACTUALLY HAD ANY THOUGHTS OF KILLING YOURSELF?: NO
1. IN THE PAST MONTH, HAVE YOU WISHED YOU WERE DEAD OR WISHED YOU COULD GO TO SLEEP AND NOT WAKE UP?: NO

## 2024-12-03 NOTE — ANESTHESIA PREPROCEDURE EVALUATION
Patient: Andrey Drake    Procedure Information       Date/Time: 12/03/24 0730    Scheduled providers: Sammy Ackerman MD    Procedure: COLONOSCOPY    Location: [facility]            Relevant Problems   Cardiac   (+) Hyperlipidemia   (+) Primary hypertension      Pulmonary   (+) Dyspnea on exertion      Neuro   (+) Acoustic neuroma (Multi)   (+) Anxiety reaction   (+) Crocodile tears syndrome   (+) Depression   (+) Major depression      Endocrine   (+) Class 1 obesity due to excess calories with serious comorbidity and body mass index (BMI) of 34.0 to 34.9 in adult      HEENT   (+) Asymmetric SNHL (sensorineural hearing loss)   (+) Left-sided sensorineural hearing loss   (+) Mixed hearing loss of right ear   (+) Sensorineural hearing loss (SNHL) of left ear with restricted hearing of right ear      ID   (+) Fungal meningitis (HHS-HCC)   (+) Infection associated with neurological device (CMS-HCC)   (+) Meningitis due to gram-negative bacteria (HHS-HCC)   (+) Oth bacterial agents as the cause of diseases classd elswhr   (+) Thrush, oral       Clinical information reviewed:   Tobacco  Allergies  Meds   Med Hx  Surg Hx   Fam Hx  Soc Hx        NPO Detail:  NPO/Void Status  Date of Last Liquid: 12/03/24  Time of Last Liquid: 0430  Date of Last Solid: 12/01/24  Time of Last Solid: 2100         Physical Exam    Airway  Mallampati: II  TM distance: >3 FB  Neck ROM: full     Cardiovascular - normal exam     Dental    Pulmonary - normal exam     Abdominal - normal exam             Anesthesia Plan    History of general anesthesia?: yes  History of complications of general anesthesia?: no    ASA 2     MAC     intravenous induction   Anesthetic plan and risks discussed with patient.    Plan discussed with CRNA.

## 2024-12-03 NOTE — ANESTHESIA POSTPROCEDURE EVALUATION
Patient: Andrey Drake    Procedure Summary       Date: 12/03/24 [place] [facility] [place]    Anesthesia Start: 0806 Anesthesia Stop:     Procedure: COLONOSCOPY Diagnosis:       Colon cancer screening      Colon cancer screening    Scheduled Providers: Sammy Ackerman MD Responsible Provider: Jorgito Baires DO    Anesthesia Type: MAC ASA Status: 2            Anesthesia Type: MAC    Vitals Value Taken Time   /76 12/03/24 0830   Temp 36 12/03/24 0830   Pulse 72 12/03/24 0830   Resp 16 12/03/24 0830   SpO2 97 12/03/24 0830       Anesthesia Post Evaluation    Patient location during evaluation: PACU  Patient participation: complete - patient cannot participate  Level of consciousness: sleepy but conscious  Pain management: adequate  Airway patency: patent  Cardiovascular status: acceptable  Respiratory status: acceptable and spontaneous ventilation  Hydration status: acceptable  Postoperative Nausea and Vomiting: none        There were no known notable events for this encounter.

## 2024-12-03 NOTE — H&P
History Of Present Illness  Andrey Drake is a 59 y.o. male presenting with colon cancer screening     Past Medical History  Past Medical History:   Diagnosis Date    Personal history of other diseases of the nervous system and sense organs     History of hearing loss    Personal history of other specified conditions     History of snoring    Unspecified nonsuppurative otitis media, right ear 10/21/2020    Fluid level behind tympanic membrane of right ear     Surgical History  Past Surgical History:   Procedure Laterality Date    OTHER SURGICAL HISTORY  08/19/2020    Shoulder surgery    OTHER SURGICAL HISTORY  01/20/2022    Craniotomy     Social History  He reports that he has never smoked. He has never been exposed to tobacco smoke. He has never used smokeless tobacco. He reports current alcohol use of about 2.0 standard drinks of alcohol per week. He reports that he does not use drugs.    Family History  Family History   Problem Relation Name Age of Onset    No Known Problems Mother      Hypotension Other          Allergies  Allergies   Allergen Reactions    Other Other     Review of Systems   Constitutional:  Negative for chills, fever and unexpected weight change.   HENT:  Negative for congestion and trouble swallowing.    Respiratory:  Negative for cough, shortness of breath and wheezing.    Cardiovascular:  Negative for chest pain.   Gastrointestinal:  Negative for abdominal distention, abdominal pain, constipation, diarrhea, nausea and vomiting.   Genitourinary:  Negative for difficulty urinating.   Musculoskeletal:  Negative for arthralgias and joint swelling.   Skin:  Negative for color change.   Neurological:  Negative for dizziness, speech difficulty, light-headedness and headaches.   Psychiatric/Behavioral:  Negative for confusion and sleep disturbance.         Physical Exam  Constitutional:       General: He is awake.      Appearance: Normal appearance.   HENT:      Head: Normocephalic and  "atraumatic.      Nose: Nose normal.      Mouth/Throat:      Mouth: Mucous membranes are moist.   Eyes:      Pupils: Pupils are equal, round, and reactive to light.   Neck:      Thyroid: No thyroid mass.      Trachea: Phonation normal.   Cardiovascular:      Rate and Rhythm: Normal rate and regular rhythm.      Heart sounds: Normal heart sounds. No murmur heard.     No gallop.   Pulmonary:      Effort: Pulmonary effort is normal. No respiratory distress.      Breath sounds: Normal air entry. No decreased breath sounds, wheezing, rhonchi or rales.   Abdominal:      General: Bowel sounds are normal. There is no distension.      Palpations: Abdomen is soft.      Tenderness: There is no abdominal tenderness.   Musculoskeletal:      Cervical back: Neck supple.      Right lower leg: No edema.      Left lower leg: No edema.   Skin:     General: Skin is warm.      Capillary Refill: Capillary refill takes less than 2 seconds.   Neurological:      General: No focal deficit present.      Mental Status: He is alert and oriented to person, place, and time. Mental status is at baseline.      Cranial Nerves: Cranial nerves 2-12 are intact.      Motor: Motor function is intact.   Psychiatric:         Attention and Perception: Attention and perception normal.         Mood and Affect: Mood normal.         Speech: Speech normal.         Behavior: Behavior normal.          Last Recorded Vitals  Blood pressure (!) 157/102, pulse 85, temperature 36.5 °C (97.7 °F), temperature source Temporal, resp. rate 18, height 1.88 m (6' 2\"), weight 108 kg (238 lb), SpO2 97%.    Assessment/Plan   Colon cancer screening  Colonoscopy     Sammy Ackerman MD  "

## 2024-12-03 NOTE — DISCHARGE INSTRUCTIONS
Patient Instructions after a Colonoscopy      The anesthetics, sedatives or narcotics which were given to you today will be acting in your body for the next 24 hours, so you might feel a little sleepy or groggy.  This feeling should slowly wear off. Carefully read and follow the instructions.     You received sedation today:  - Do not drive or operate any machinery or power tools of any kind.   - No alcoholic beverages today, not even beer or wine.  - Do not make any important decisions or sign any legal documents.  - No over the counter medications that contain alcohol or that may cause drowsiness.  - Do not make any important decisions or sign any legal documents.  - Make sure you have someone with you for first 24 hours.    While it is common to experience mild to moderate abdominal distention, gas, or belching after your procedure, if any of these symptoms occur following discharge from the GI Lab or within one week of having your procedure, call the [facility] to be advised whether a visit to your nearest Urgent Care or Emergency Department is indicated.  Take this paper with you if you go.     - If you develop an allergic reaction to the medications that were given during your procedure such as difficulty breathing, rash, hives, severe nausea, vomiting or lightheadedness.  - If you experience chest pain, shortness of breath, severe abdominal pain, fevers and chills.  -If you develop signs and symptoms of bleeding such as blood in your spit, if your stools turn black, tarry, or bloody  - If you have not urinated within 8 hours following your procedure.  - If your IV site becomes painful, red, inflamed, or looks infected.    If you received a biopsy/polypectomy/sphincterotomy the following instructions apply below:    __ Do not use Aspirin containing products, non-steroidal medications or anti-coagulants for one week following your procedure. (Examples of these types of medications are: Advil,  Arthrotec, Aleve, Coumadin, Ecotrin, Heparin, Ibuprofen, Indocin, Motrin, Naprosyn, Nuprin, Plavix, Vioxx, and Voltarin, or their generic forms.  This list is not all-inclusive.  Check with your physician or pharmacist before resuming medications.)   __ Eat a soft diet today.  Avoid foods that are poorly digested for the next 24 hours.  These foods would include: nuts, beans, lettuce, red meats, and fried foods. Start with liquids and advance your diet as tolerated, gradually work up to eating solids.   __ Do not have a Barium Study or Enema for one week.    Your physician recommends the additional following instructions:    -You have a contact number available for emergencies. The signs and symptoms of potential delayed complications were discussed with you. You may return to normal activities tomorrow.  -Resume your previous diet.  -Continue your present medications.   -We are waiting for your pathology results.  -Your physician has recommended a repeat colonoscopy (date to be determined after pending pathology results are reviewed) for surveillance based on pathology results.  -The findings and recommendations have been discussed with you.  -The findings and recommendations were discussed with your family.  - Please see Medication Reconciliation Form for new medication/medications prescribed.       If you experience any problems or have any questions following discharge from the GI Lab, please call:        Nurse Signature                                                                        Date___________________                                                                            Patient/Responsible Party Signature                                        Date___________________

## 2024-12-10 LAB
LABORATORY COMMENT REPORT: NORMAL
PATH REPORT.FINAL DX SPEC: NORMAL
PATH REPORT.GROSS SPEC: NORMAL
PATH REPORT.RELEVANT HX SPEC: NORMAL
PATH REPORT.TOTAL CANCER: NORMAL

## 2024-12-12 ENCOUNTER — APPOINTMENT (OUTPATIENT)
Dept: BEHAVIORAL HEALTH | Facility: CLINIC | Age: 59
End: 2024-12-12
Payer: MEDICARE

## 2024-12-12 DIAGNOSIS — G47.00 INSOMNIA, UNSPECIFIED TYPE: ICD-10-CM

## 2024-12-12 DIAGNOSIS — F33.1 MODERATE EPISODE OF RECURRENT MAJOR DEPRESSIVE DISORDER: ICD-10-CM

## 2024-12-12 DIAGNOSIS — F41.1 GAD (GENERALIZED ANXIETY DISORDER): ICD-10-CM

## 2024-12-12 PROCEDURE — 99215 OFFICE O/P EST HI 40 MIN: CPT | Performed by: CLINICAL NURSE SPECIALIST

## 2024-12-12 NOTE — PROGRESS NOTES
Outpatient Psychiatry      Subjective   Andrey Drake, a 59 y.o. male, presents as an established patient for an outpatient appointment /medication management in psychiatry  for an audio and video virtual  appointment , he was at home for the appointment      Virtual or Telephone Consent     An interactive audio and video telecommunication system which permits real time communications between the patient (at the originating site) and provider (at the distant site) was utilized to provide this telehealth service.   Verbal consent was requested and obtained from Andrey Drake on this date, 12/12/24  for a telehealth visit.       Diagnosis: ·      RONDA (generalized anxiety disorder) (300.02) (F41.1) moderate    · Insomnia unspecified type mild G47.00   · Depression, major recurrent ,moderate ( primary diagnoses) F 33.1     Treatment Plan/Recommendations:   Follow-up plan  was discussed with patient.  can follow up in 4-6 weeks , can continue self care and wellness efforts and maintain routine health screenings can call  for treatment and scheduling concerns  Psychotropic medications :  Continue sertraline 100 mg 1 and 1/2 each morning and 1/2 tablets each morning for depression and anxiety    Continue trazodone 50 mg , 2 tablets daily each night as needed at bedtime for sleep / insomnia       HPI:  reviewed the notes in the [facility] emr from other medical providers    Mood is more depressed   Has low motivation and low energy   isolates at times to himself sometimes ruminates on conversations and social events when he wonders what others think of him   Everyday tasks are difficult   At night gets ringing in his ears , he goes over conversations in his mind repeatedly in a ruminative manner   has not been sleeping well   there is no indication of issues with mood cycling   no psychoses   he actively participated in discussion   affect is congruent , depressed   no panic attacks recently   no thoughts of  harming himself or others   denies issues or history of substance abuse  No side effects of sertraline   no psychoses   He would like to keep his medications the same for now , if there is no improvement , with depression , will consider adding an adjunct medication      Psych ros and medical ros as noted above         Patient Active Problem List   Diagnosis    Major depression    Depression    Crocodile tears syndrome    Cognitive dysfunction    Chronic tonsillitis    Chronic otitis media with effusion    Chronic mastoiditis of right side    Attention deficit disorder    Asymmetric tonsils    Left-sided sensorineural hearing loss    Asymmetric SNHL (sensorineural hearing loss)    Anxiety reaction    Acoustic neuroma (Multi)    Other long term (current) drug therapy    Nausea and/or vomiting    Mixed hearing loss of right ear    Meningitis due to gram-negative bacteria (HHS-HCC)    Meningitis (HHS-HCC)    Lumbago    Insomnia    Infection associated with neurological device (CMS-HCC)    Imbalance    Hypokalemia    Fungal meningitis (HHS-HCC)    Expressive aphasia    Dryness of right eye due to decreased tear production    Vertigo    Dizziness    Other cranial cerebrospinal fluid leak    Hyperlipidemia    Encounter for adjustment and management of vascular access device    Other abnormalities of gait and mobility    Oth bacterial agents as the cause of diseases classd elswhr    Unsteady gait    Tinnitus, right    Thrush, oral    Sensorineural hearing loss (SNHL) of left ear with restricted hearing of right ear    Rhinorrhea    Retraction of tympanic membrane of left ear    Pressure sensation in right ear    Persistent headaches    Persistent cough for 3 weeks or longer    Otitis externa    Long term (current) use of antibiotics    Facial weakness    Primary hypertension    Class 1 obesity due to excess calories with serious comorbidity and body mass index (BMI) of 34.0 to 34.9 in adult    Accidental fall    Acute  thoracic back pain    Common migraine with intractable migraine    Erectile dysfunction    Exposure to severe acute respiratory syndrome coronavirus 2 (SARS-CoV-2)    Fatigue    History of acoustic neuroma    History of hearing loss    Infarction of testicle    Numbness and tingling sensation of skin    Pain of joint of both hands    Persistent pain in testicle    Spermatic cord inflammation    Weight gain    Encounter for weight loss counseling    Agatston coronary artery calcium score less than 100    Health maintenance examination    Injury of tendon of foot    Foot injury, left, initial encounter    Left foot pain    Chronically elevated hemidiaphragm    Hypoxia    Dyspnea on exertion    Prostate cancer screening    Low vitamin B12 level    Colon cancer screening    Need for influenza vaccination     Current Outpatient Medications:     acetaminophen (TylenoL) 325 mg capsule, Take by mouth., Disp: , Rfl:     acetaZOLAMIDE (Diamox) 125 mg tablet, Take 1 tablet (125 mg) by mouth 2 times a day., Disp: 180 tablet, Rfl: 2    ipratropium (Atrovent) 21 mcg (0.03 %) nasal spray, SPRAY 2 SPRAYS INTO EACH NOSTRIL 3 TIMES A DAY AS NEEDED, Disp: , Rfl:     LORazepam (Ativan) 0.5 mg tablet, Take by mouth., Disp: , Rfl:     methocarbamol (Robaxin) 500 mg tablet, 2 tablet EVERY 8 HOURS (route: oral), Disp: , Rfl:     rizatriptan (Maxalt) 10 mg tablet, TAKE 1 TABLET AT ONSET OF HEADACHE MAY REPEAT EVERY 2 HOURS AS NEEDED. MAX 3 TABS/24 HRS, Disp: , Rfl:     rosuvastatin (Crestor) 5 mg tablet, Take 1 tablet (5 mg) by mouth once daily., Disp: 90 tablet, Rfl: 3    sertraline (Zoloft) 100 mg tablet, Take 2 tablets (200 mg) by mouth once daily., Disp: 180 tablet, Rfl: 1    SUMAtriptan (Imitrex) 50 mg tablet, Take 1 tablet (50 mg) by mouth 1 time if needed for migraine. May repeat dose once in 2 hours if no relief.  Do not exceed 2 doses in 24 hours., Disp: 9 tablet, Rfl: 6    tetrahydrozoline-zinc (Visine-AC) 0.05-0.25 % ophthalmic  solution, Administer into affected eye(s) twice a day., Disp: , Rfl:     traZODone (Desyrel) 50 mg tablet, 1-2 tablets daily at bedtime as needed for sleep, Disp: 180 tablet, Rfl: 0    valsartan (Diovan) 80 mg tablet, Take 1 tablet (80 mg) by mouth once daily., Disp: 90 tablet, Rfl: 1    verapamil (Calan) 80 mg tablet, Take 2 tablets (160 mg) by mouth 2 times a day., Disp: 360 tablet, Rfl: 3    Vitamin B-2 100 mg tablet tablet, Take 2 tablets (200 mg) by mouth 2 times a day., Disp: , Rfl:     white petrolatum-mineral oiL 94-3 % ophthalmic ointment, Apply to affected eye(s)., Disp: , Rfl:   Medical History:  Past Medical History:   Diagnosis Date    Personal history of other diseases of the nervous system and sense organs     History of hearing loss    Personal history of other specified conditions     History of snoring    Unspecified nonsuppurative otitis media, right ear 10/21/2020    Fluid level behind tympanic membrane of right ear     Surgical History:  Past Surgical History:   Procedure Laterality Date    OTHER SURGICAL HISTORY  08/19/2020    Shoulder surgery    OTHER SURGICAL HISTORY  01/20/2022    Craniotomy     Family History:  Family History   Problem Relation Name Age of Onset    No Known Problems Mother      Hypotension Other       Vitals:  There were no vitals filed for this visit.    Padmini Gerard, APRN-CNS

## 2024-12-13 NOTE — RESULT ENCOUNTER NOTE
Andrey Diaz    During recent colonoscopy a polyp was seen and completely removed.  Pathology results show this polyp as tubular adenoma.  This kind of polyp is benign but precancerous.  Based on pathology results I recommend repeating colonoscopy in 5 years.  Do not hesitate to contact me if you have any questions or concerns.    Sincerely,   Sammy Ackerman MD

## 2024-12-17 ENCOUNTER — APPOINTMENT (OUTPATIENT)
Dept: NEUROLOGY | Facility: CLINIC | Age: 59
End: 2024-12-17

## 2025-01-23 ENCOUNTER — APPOINTMENT (OUTPATIENT)
Dept: BEHAVIORAL HEALTH | Facility: CLINIC | Age: 60
End: 2025-01-23
Payer: COMMERCIAL

## 2025-01-23 DIAGNOSIS — F33.1 DEPRESSION, MAJOR, RECURRENT, MODERATE: ICD-10-CM

## 2025-01-23 DIAGNOSIS — G31.84 MILD COGNITIVE IMPAIRMENT: ICD-10-CM

## 2025-01-23 DIAGNOSIS — G47.00 INSOMNIA, UNSPECIFIED TYPE: ICD-10-CM

## 2025-01-23 DIAGNOSIS — F41.1 GAD (GENERALIZED ANXIETY DISORDER): ICD-10-CM

## 2025-01-23 PROCEDURE — 99215 OFFICE O/P EST HI 40 MIN: CPT | Performed by: CLINICAL NURSE SPECIALIST

## 2025-01-23 RX ORDER — TRAZODONE HYDROCHLORIDE 50 MG/1
TABLET ORAL
Qty: 180 TABLET | Refills: 1 | Status: SHIPPED | OUTPATIENT
Start: 2025-01-23

## 2025-01-23 RX ORDER — SERTRALINE HYDROCHLORIDE 100 MG/1
200 TABLET, FILM COATED ORAL DAILY
Qty: 180 TABLET | Refills: 1 | Status: SHIPPED | OUTPATIENT
Start: 2025-01-23 | End: 2025-04-23

## 2025-01-23 NOTE — PROGRESS NOTES
Outpatient Psychiatry      Subjective   Andrey Drake, a 59 y.o. male, presents as an established patient for an outpatient appointment /medication management in psychiatry  for an audio and video virtual  appointment , he was at home for the appointment      Virtual or Telephone Consent     An interactive audio and video telecommunication system which permits real time communications between the patient (at the originating site) and provider (at the distant site) was utilized to provide this telehealth service.   Verbal consent was requested and obtained from Andrey Drake on this date, 1/23/25  for a telehealth visit.       Diagnosis: ·      RONDA (generalized anxiety disorder) (300.02) (F41.1) moderate    · Insomnia unspecified type mild G47.00   · Depression, major recurrent ,moderate ( primary diagnoses) F 33.1  Mild cognitive impairment G 31. 84 per neuropsychological evaluation may 2024      Treatment Plan/Recommendations:   Follow-up plan  was discussed with patient.  can follow up in 4-6 weeks , can continue self care and wellness efforts and maintain routine health screenings can call  for treatment and scheduling concerns  Psychotropic medications :  Continue sertraline 100 mg 1 and 1/2 each morning and 1/2 tablets each morning for depression and anxiety    Continue trazodone 50 mg , 2 tablets daily each night as needed at bedtime for sleep / insomnia       HPI:  reviewed the notes in the [facility] emr from other medical providers    Mood is depressed with depressed and congruent affect   Has low motivation and low energy   Isolates to himself   Has not attended social events when depressed   ruminates on conversations and social events  Very self conscious over what others think of him at social events , which increases anxiety   Everyday tasks are difficult   Has difficulty with speech and word finding and this is worse when he is anxious   Appears overwhelmed at times   At night gets  ringing in his ears , he goes over conversations in his mind repeatedly in a ruminative manner   has not been sleeping well   there is no indication of issues with mood cycling   no psychoses   he actively participated in discussion    no panic attacks recently   no thoughts of harming himself or others   denies issues or history of substance abuse  No side effects of sertraline   no psychoses   He would like to keep his medications the same for now , if there is no improvement , with depression , will consider adding an adjunct medication   Able to express his thoughts with some word finding difficulties at times   Gets headaches frequently , he will be seeing a new neurologist   Mental status :  Hygiene is good   Appears fatigued   Behavior is appropriate   Alert and oriented times 4   Some thought blocking is apparent   Some word finding difficulties, expressive aphasia    No altered thoughts , no delusional thoughts , no hallucinations    Participated in discussion   Speech is slow   No internal stimulation      Psych ros and medical ros as noted above            Patient Active Problem List   Diagnosis    Major depression    Depression    Crocodile tears syndrome    Cognitive dysfunction    Chronic tonsillitis    Chronic otitis media with effusion    Chronic mastoiditis of right side    Attention deficit disorder    Asymmetric tonsils    Left-sided sensorineural hearing loss    Asymmetric SNHL (sensorineural hearing loss)    Anxiety reaction    Acoustic neuroma (Multi)    Other long term (current) drug therapy    Nausea and/or vomiting    Mixed hearing loss of right ear    Meningitis due to gram-negative bacteria (HHS-HCC)    Meningitis (HHS-HCC)    Lumbago    Insomnia    Infection associated with neurological device (CMS-HCC)    Imbalance    Hypokalemia    Fungal meningitis (HHS-HCC)    Expressive aphasia    Dryness of right eye due to decreased tear production    Vertigo    Dizziness    Other cranial  cerebrospinal fluid leak    Hyperlipidemia    Encounter for adjustment and management of vascular access device    Other abnormalities of gait and mobility    Oth bacterial agents as the cause of diseases classd elswhr    Unsteady gait    Tinnitus, right    Thrush, oral    Sensorineural hearing loss (SNHL) of left ear with restricted hearing of right ear    Rhinorrhea    Retraction of tympanic membrane of left ear    Pressure sensation in right ear    Persistent headaches    Persistent cough for 3 weeks or longer    Otitis externa    Long term (current) use of antibiotics    Facial weakness    Primary hypertension    Class 1 obesity due to excess calories with serious comorbidity and body mass index (BMI) of 34.0 to 34.9 in adult    Accidental fall    Acute thoracic back pain    Common migraine with intractable migraine    Erectile dysfunction    Exposure to severe acute respiratory syndrome coronavirus 2 (SARS-CoV-2)    Fatigue    History of acoustic neuroma    History of hearing loss    Infarction of testicle    Numbness and tingling sensation of skin    Pain of joint of both hands    Persistent pain in testicle    Spermatic cord inflammation    Weight gain    Encounter for weight loss counseling    Agatston coronary artery calcium score less than 100    Health maintenance examination    Injury of tendon of foot    Foot injury, left, initial encounter    Left foot pain    Chronically elevated hemidiaphragm    Hypoxia    Dyspnea on exertion    Prostate cancer screening    Low vitamin B12 level    Colon cancer screening    Need for influenza vaccination     Current Outpatient Medications:     acetaminophen (TylenoL) 325 mg capsule, Take by mouth., Disp: , Rfl:     acetaZOLAMIDE (Diamox) 125 mg tablet, Take 1 tablet (125 mg) by mouth 2 times a day., Disp: 180 tablet, Rfl: 2    ipratropium (Atrovent) 21 mcg (0.03 %) nasal spray, SPRAY 2 SPRAYS INTO EACH NOSTRIL 3 TIMES A DAY AS NEEDED, Disp: , Rfl:     LORazepam  (Ativan) 0.5 mg tablet, Take by mouth., Disp: , Rfl:     methocarbamol (Robaxin) 500 mg tablet, 2 tablet EVERY 8 HOURS (route: oral), Disp: , Rfl:     rizatriptan (Maxalt) 10 mg tablet, TAKE 1 TABLET AT ONSET OF HEADACHE MAY REPEAT EVERY 2 HOURS AS NEEDED. MAX 3 TABS/24 HRS, Disp: , Rfl:     rosuvastatin (Crestor) 5 mg tablet, Take 1 tablet (5 mg) by mouth once daily., Disp: 90 tablet, Rfl: 3    sertraline (Zoloft) 100 mg tablet, Take 2 tablets (200 mg) by mouth once daily., Disp: 180 tablet, Rfl: 1    SUMAtriptan (Imitrex) 50 mg tablet, Take 1 tablet (50 mg) by mouth 1 time if needed for migraine. May repeat dose once in 2 hours if no relief.  Do not exceed 2 doses in 24 hours., Disp: 9 tablet, Rfl: 6    tetrahydrozoline-zinc (Visine-AC) 0.05-0.25 % ophthalmic solution, Administer into affected eye(s) twice a day., Disp: , Rfl:     traZODone (Desyrel) 50 mg tablet, 1-2 tablets daily at bedtime as needed for sleep, Disp: 180 tablet, Rfl: 0    valsartan (Diovan) 80 mg tablet, Take 1 tablet (80 mg) by mouth once daily., Disp: 90 tablet, Rfl: 1    verapamil (Calan) 80 mg tablet, Take 2 tablets (160 mg) by mouth 2 times a day., Disp: 360 tablet, Rfl: 3    Vitamin B-2 100 mg tablet tablet, Take 2 tablets (200 mg) by mouth 2 times a day., Disp: , Rfl:     white petrolatum-mineral oiL 94-3 % ophthalmic ointment, Apply to affected eye(s)., Disp: , Rfl:   Medical History:  Past Medical History:   Diagnosis Date    Personal history of other diseases of the nervous system and sense organs     History of hearing loss    Personal history of other specified conditions     History of snoring    Unspecified nonsuppurative otitis media, right ear 10/21/2020    Fluid level behind tympanic membrane of right ear     Surgical History:  Past Surgical History:   Procedure Laterality Date    OTHER SURGICAL HISTORY  08/19/2020    Shoulder surgery    OTHER SURGICAL HISTORY  01/20/2022    Craniotomy     Family History:  Family History    Problem Relation Name Age of Onset    No Known Problems Mother      Hypotension Other       Social History:  Social History     Socioeconomic History    Marital status:      Spouse name: Not on file    Number of children: Not on file    Years of education: Not on file    Highest education level: Not on file   Occupational History    Not on file   Tobacco Use    Smoking status: Never     Passive exposure: Never    Smokeless tobacco: Never   Vaping Use    Vaping status: Never Used   Substance and Sexual Activity    Alcohol use: Yes     Alcohol/week: 2.0 standard drinks of alcohol     Types: 2 Cans of beer per week    Drug use: Never    Sexual activity: Not on file   Other Topics Concern    Not on file   Social History Narrative    Not on file     Social Drivers of Health     Financial Resource Strain: Not on file   Food Insecurity: Not on file   Transportation Needs: Not on file   Physical Activity: Not on file   Stress: Not on file   Social Connections: Not on file   Intimate Partner Violence: Not on file   Housing Stability: Not on file     Vitals:  There were no vitals filed for this visit.    Padmini Gerard, APRN-CNS

## 2025-01-29 NOTE — PROGRESS NOTES
Collaborative Care (CoCM)  Progress Note    Type of Interaction: In Office    Start Time: 1:00pm    End Time: 2:00pm    Patient states that his depression has decreased since last session. Patient's hearing aids didn't work and he needs to be started on another migraine medication. Patient feels that nothing regarding his health is ever fully resolved. Patients PT has encouraged him to attend group therapy for people with neurological issues. Patient reports that he's reluctant doesn't want to retell his story. Encouraged patient to reconsider attending the group. Patient continues to struggle with his self-confidence and negative self-talk. Gave patient information on how to decrease/manage negative self-talk. Informed patient that this provider will be transitioning to another role at the hospital. Will meet for final session in March     Appointment: Scheduled          Interventions Provided: Solution Focused Therapy      Progress Made: Moderate    Response to Intervention: Receptive         Plan:   Patient given information on how to decrease negative self-talk. Will assist patient with transitioning to another provider             Follow Up / Next Appointment:  March 20th       
English

## 2025-02-24 ENCOUNTER — OFFICE VISIT (OUTPATIENT)
Dept: URGENT CARE | Age: 60
End: 2025-02-24
Payer: MEDICARE

## 2025-02-24 VITALS
DIASTOLIC BLOOD PRESSURE: 88 MMHG | SYSTOLIC BLOOD PRESSURE: 156 MMHG | RESPIRATION RATE: 19 BRPM | HEART RATE: 79 BPM | TEMPERATURE: 98.5 F | BODY MASS INDEX: 19.76 KG/M2 | WEIGHT: 154 LBS | HEIGHT: 74 IN | OXYGEN SATURATION: 96 %

## 2025-02-24 DIAGNOSIS — R06.2 WHEEZING: Primary | ICD-10-CM

## 2025-02-24 DIAGNOSIS — J01.90 ACUTE NON-RECURRENT SINUSITIS, UNSPECIFIED LOCATION: ICD-10-CM

## 2025-02-24 DIAGNOSIS — R69 SICK: ICD-10-CM

## 2025-02-24 LAB
POC RAPID INFLUENZA A: NEGATIVE
POC RAPID INFLUENZA B: NEGATIVE
POC SARS-COV-2 AG BINAX: NORMAL

## 2025-02-24 RX ORDER — IPRATROPIUM BROMIDE AND ALBUTEROL SULFATE 2.5; .5 MG/3ML; MG/3ML
3 SOLUTION RESPIRATORY (INHALATION) ONCE
Status: SHIPPED | OUTPATIENT
Start: 2025-02-24

## 2025-02-24 RX ORDER — AMOXICILLIN AND CLAVULANATE POTASSIUM 875; 125 MG/1; MG/1
875 TABLET, FILM COATED ORAL 2 TIMES DAILY
Qty: 20 TABLET | Refills: 0 | Status: SHIPPED | OUTPATIENT
Start: 2025-02-24

## 2025-02-24 ASSESSMENT — ENCOUNTER SYMPTOMS
SINUS PRESSURE: 1
RHINORRHEA: 1
EYES NEGATIVE: 1
SINUS PAIN: 1

## 2025-02-24 NOTE — PROGRESS NOTES
"Subjective   Patient ID: Andrey Drake is a 59 y.o. male. They present today with a chief complaint of Sinusitis (X1 week / Ear ache ).    History of Present Illness  Pt is a 59 year old with a pmh of brain tumor and meningitis in the past he has no hearing in his righ ear and is now having ringing in his left ear.  He also stsates he will be eeding to see a pulmonologist and has had hypoxia his blood pressure is higher than expected      Sinusitis  Associated symptoms: ear pain and rhinorrhea        Past Medical History  Allergies as of 02/24/2025 - Reviewed 02/24/2025   Allergen Reaction Noted    Other Other 01/18/2022       (Not in a hospital admission)       Past Medical History:   Diagnosis Date    Personal history of other diseases of the nervous system and sense organs     History of hearing loss    Personal history of other specified conditions     History of snoring    Unspecified nonsuppurative otitis media, right ear 10/21/2020    Fluid level behind tympanic membrane of right ear       Past Surgical History:   Procedure Laterality Date    OTHER SURGICAL HISTORY  08/19/2020    Shoulder surgery    OTHER SURGICAL HISTORY  01/20/2022    Craniotomy        reports that he has never smoked. He has never been exposed to tobacco smoke. He has never used smokeless tobacco. He reports current alcohol use of about 2.0 standard drinks of alcohol per week. He reports that he does not use drugs.    Review of Systems  Review of Systems   HENT:  Positive for ear pain, postnasal drip, rhinorrhea, sinus pressure and sinus pain.    Eyes: Negative.                                   Objective    Vitals:    02/24/25 1333   BP: 156/88   Pulse: 79   Resp: 19   Temp: 36.9 °C (98.5 °F)   SpO2: 96%   Weight: 69.9 kg (154 lb)   Height: 1.88 m (6' 2\")     No LMP for male patient.    Physical Exam  Constitutional:       Appearance: Normal appearance.   HENT:      Head: Normocephalic and atraumatic.      Right Ear: Tympanic membrane " normal.      Left Ear: Tympanic membrane normal.      Mouth/Throat:      Mouth: Mucous membranes are moist.   Eyes:      Pupils: Pupils are equal, round, and reactive to light.   Cardiovascular:      Rate and Rhythm: Normal rate.   Pulmonary:      Comments: Decreased breath sounds better with albuterol  Musculoskeletal:      Cervical back: Normal range of motion.   Neurological:      Mental Status: He is alert.         Procedures    Point of Care Test & Imaging Results from this visit  No results found for this visit on 02/24/25.   No results found.    Diagnostic study results (if any) were reviewed by Courtney Woodward MD.    Assessment/Plan   Allergies, medications, history, and pertinent labs/EKGs/Imaging reviewed by Courtney Woodward MD.     Medical Decision Making  Pt with purulent rhinorrhea and facial pressure   Discussed albuterol he declines at this point and would like to see pulm  Iscussed blood pressure    Orders and Diagnoses  Diagnoses and all orders for this visit:  Sick  -     POCT Covid-19 Rapid Antigen  -     POCT Influenza A/B manually resulted      Medical Admin Record      Patient disposition: Home    Electronically signed by Courtney Woodward MD  1:44 PM

## 2025-03-04 ENCOUNTER — PATIENT OUTREACH (OUTPATIENT)
Dept: CARE COORDINATION | Facility: CLINIC | Age: 60
End: 2025-03-04
Payer: MEDICARE

## 2025-03-04 NOTE — PROGRESS NOTES
Date: 03/04/25    Dear Andrey Drake    Our records indicate that you are due for the following appointment or test: Annual Wellness Visit      Please call our office at your earliest convenience. We can assist you with scheduling an appointment or test.  If you have already scheduled an appointment or have completed testing, please disregard this letter.    Sincerely,    Dawn Nix    [facility]  [address] [place] [address]    Office Phone: [phone]  Patient Navigator: [phone]

## 2025-03-12 ENCOUNTER — APPOINTMENT (OUTPATIENT)
Dept: BEHAVIORAL HEALTH | Facility: CLINIC | Age: 60
End: 2025-03-12
Payer: MEDICARE

## 2025-03-19 ENCOUNTER — TELEPHONE (OUTPATIENT)
Dept: OTOLARYNGOLOGY | Facility: CLINIC | Age: 60
End: 2025-03-19
Payer: MEDICARE

## 2025-03-19 NOTE — TELEPHONE ENCOUNTER
Contacted patient regarding symptoms he called about with central scheduling. Gave office number for  to return call.

## 2025-03-27 ENCOUNTER — APPOINTMENT (OUTPATIENT)
Dept: BEHAVIORAL HEALTH | Facility: CLINIC | Age: 60
End: 2025-03-27
Payer: MEDICARE

## 2025-03-27 DIAGNOSIS — G31.84 MILD COGNITIVE IMPAIRMENT: ICD-10-CM

## 2025-03-27 DIAGNOSIS — F33.1 MODERATE EPISODE OF RECURRENT MAJOR DEPRESSIVE DISORDER: ICD-10-CM

## 2025-03-27 DIAGNOSIS — G47.00 INSOMNIA, UNSPECIFIED TYPE: ICD-10-CM

## 2025-03-27 DIAGNOSIS — F41.1 GAD (GENERALIZED ANXIETY DISORDER): ICD-10-CM

## 2025-03-27 PROCEDURE — 99213 OFFICE O/P EST LOW 20 MIN: CPT | Performed by: CLINICAL NURSE SPECIALIST

## 2025-03-27 NOTE — PROGRESS NOTES
"Outpatient Psychiatry      Subjective   Andrey Drake \"Miguel", a 59 y.o. male, presents as an established patient for an outpatient appointment /medication management in psychiatry  for an audio and video virtual  appointment , he was at home for the appointment      Virtual or Telephone Consent     An interactive audio and video telecommunication system which permits real time communications between the patient (at the originating site) and provider (at the distant site) was utilized to provide this telehealth service.   Verbal consent was requested and obtained from Andrey Drake on this date, 3/27/25  for a telehealth visit.       Diagnosis: ·      RODNA (generalized anxiety disorder) (300.02) (F41.1) moderate    · Insomnia unspecified type mild G47.00   · Depression, major recurrent ,moderate ( primary diagnoses) F 33.1  Mild cognitive impairment G 31. 84 per neuropsychological evaluation may 2024      Treatment Plan/Recommendations:   Follow-up plan  was discussed with patient.  can follow up in 4-6 weeks , can continue self care and wellness efforts and maintain routine health screenings can call  for treatment and scheduling concerns  Psychotropic medications :  Continue sertraline 100 mg 1 and 1/2 each morning and 1/2 tablets each morning for depression and anxiety    Continue trazodone 50 mg , 2 tablets daily each night as needed at bedtime for sleep / insomnia       HPI:  reviewed the notes in the [facility] emr from other medical providers    Mood is depressed with depressed and congruent affect   Has low motivation and low energy   Isolates to himself   Has not attended social events when depressed   ruminates on conversations and social events  Very self conscious over what others think of him at social events , which increases anxiety   Everyday tasks are difficult   Has difficulty with speech and word finding and this is worse when he is anxious   Appears overwhelmed at times   At night gets " ringing in his ears , he goes over conversations in his mind repeatedly in a ruminative manner   has not been sleeping well   there is no indication of issues with mood cycling   no psychoses   he actively participated in discussion    no panic attacks recently   no thoughts of harming himself or others   denies issues or history of substance abuse  No side effects of sertraline   no psychoses   Will coordinate care with neurology , discussed possibly switching from sertraline to escitalopram due to the presence of a high level of depressed mood and anxiety still on maximum dose of sertraline     Gets headaches frequently , he will be seeing a new neurologist, today he has a headache and wants to do the appointment still , is having more difficulty with communicating    Mental status :   Appears fatigued  , tearful    thought blocking is apparent   Has word finding difficulties, expressive aphasia    No altered thoughts , no delusional thoughts , no hallucinations     Speech is slow   No internal stimulation      Psych ros and medical ros as noted above            Patient Active Problem List   Diagnosis    Major depression    Depression    Crocodile tears syndrome    Cognitive dysfunction    Chronic tonsillitis    Chronic otitis media with effusion    Chronic mastoiditis of right side    Attention deficit disorder    Asymmetric tonsils    Left-sided sensorineural hearing loss    Asymmetric SNHL (sensorineural hearing loss)    Anxiety reaction    Acoustic neuroma (Multi)    Other long term (current) drug therapy    Nausea and/or vomiting    Mixed hearing loss of right ear    Meningitis due to gram-negative bacteria (HHS-HCC)    Meningitis (HHS-HCC)    Lumbago    Insomnia    Infection associated with neurological device (CMS-HCC)    Imbalance    Hypokalemia    Fungal meningitis (HHS-HCC)    Expressive aphasia    Dryness of right eye due to decreased tear production    Vertigo    Dizziness    Other cranial  cerebrospinal fluid leak    Hyperlipidemia    Encounter for adjustment and management of vascular access device    Other abnormalities of gait and mobility    Oth bacterial agents as the cause of diseases classd elswhr    Unsteady gait    Tinnitus, right    Thrush, oral    Sensorineural hearing loss (SNHL) of left ear with restricted hearing of right ear    Rhinorrhea    Retraction of tympanic membrane of left ear    Pressure sensation in right ear    Persistent headaches    Persistent cough for 3 weeks or longer    Otitis externa    Long term (current) use of antibiotics    Facial weakness    Primary hypertension    Class 1 obesity due to excess calories with serious comorbidity and body mass index (BMI) of 34.0 to 34.9 in adult    Accidental fall    Acute thoracic back pain    Common migraine with intractable migraine    Erectile dysfunction    Exposure to severe acute respiratory syndrome coronavirus 2 (SARS-CoV-2)    Fatigue    History of acoustic neuroma    History of hearing loss    Infarction of testicle    Numbness and tingling sensation of skin    Pain of joint of both hands    Persistent pain in testicle    Spermatic cord inflammation    Weight gain    Encounter for weight loss counseling    Agatston coronary artery calcium score less than 100    Health maintenance examination    Injury of tendon of foot    Foot injury, left, initial encounter    Left foot pain    Chronically elevated hemidiaphragm    Hypoxia    Dyspnea on exertion    Prostate cancer screening    Low vitamin B12 level    Colon cancer screening    Need for influenza vaccination     Current Outpatient Medications:     acetaminophen (TylenoL) 325 mg capsule, Take by mouth., Disp: , Rfl:     acetaZOLAMIDE (Diamox) 125 mg tablet, Take 1 tablet (125 mg) by mouth 2 times a day., Disp: 180 tablet, Rfl: 2    amoxicillin-pot clavulanate (Augmentin) 875-125 mg tablet, Take 1 tablet (875 mg) by mouth 2 times a day., Disp: 20 tablet, Rfl: 0     ipratropium (Atrovent) 21 mcg (0.03 %) nasal spray, SPRAY 2 SPRAYS INTO EACH NOSTRIL 3 TIMES A DAY AS NEEDED, Disp: , Rfl:     methocarbamol (Robaxin) 500 mg tablet, 2 tablet EVERY 8 HOURS (route: oral), Disp: , Rfl:     rizatriptan (Maxalt) 10 mg tablet, TAKE 1 TABLET AT ONSET OF HEADACHE MAY REPEAT EVERY 2 HOURS AS NEEDED. MAX 3 TABS/24 HRS, Disp: , Rfl:     rosuvastatin (Crestor) 5 mg tablet, Take 1 tablet (5 mg) by mouth once daily., Disp: 90 tablet, Rfl: 3    sertraline (Zoloft) 100 mg tablet, Take 2 tablets (200 mg) by mouth once daily., Disp: 180 tablet, Rfl: 1    SUMAtriptan (Imitrex) 50 mg tablet, Take 1 tablet (50 mg) by mouth 1 time if needed for migraine. May repeat dose once in 2 hours if no relief.  Do not exceed 2 doses in 24 hours., Disp: 9 tablet, Rfl: 6    tetrahydrozoline-zinc (Visine-AC) 0.05-0.25 % ophthalmic solution, Administer into affected eye(s) twice a day., Disp: , Rfl:     traZODone (Desyrel) 50 mg tablet, 1-2 tablets daily at bedtime as needed for sleep, Disp: 180 tablet, Rfl: 1    valsartan (Diovan) 80 mg tablet, Take 1 tablet (80 mg) by mouth once daily., Disp: 90 tablet, Rfl: 1    verapamil (Calan) 80 mg tablet, Take 2 tablets (160 mg) by mouth 2 times a day., Disp: 360 tablet, Rfl: 3    Vitamin B-2 100 mg tablet tablet, Take 2 tablets (200 mg) by mouth 2 times a day., Disp: , Rfl:     white petrolatum-mineral oiL 94-3 % ophthalmic ointment, Apply to affected eye(s)., Disp: , Rfl:     Current Facility-Administered Medications:     ipratropium-albuteroL (Duo-Neb) 0.5-2.5 mg/3 mL nebulizer solution 3 mL, 3 mL, nebulization, Once, Courtney Woodward MD  Medical History:  Past Medical History:   Diagnosis Date    Personal history of other diseases of the nervous system and sense organs     History of hearing loss    Personal history of other specified conditions     History of snoring    Unspecified nonsuppurative otitis media, right ear 10/21/2020    Fluid level behind tympanic membrane  of right ear     Surgical History:  Past Surgical History:   Procedure Laterality Date    OTHER SURGICAL HISTORY  08/19/2020    Shoulder surgery    OTHER SURGICAL HISTORY  01/20/2022    Craniotomy     Family History:  Family History   Problem Relation Name Age of Onset    No Known Problems Mother      Hypotension Other       Social History:  Social History     Socioeconomic History    Marital status:      Spouse name: Not on file    Number of children: Not on file    Years of education: Not on file    Highest education level: Not on file   Occupational History    Not on file   Tobacco Use    Smoking status: Never     Passive exposure: Never    Smokeless tobacco: Never   Vaping Use    Vaping status: Never Used   Substance and Sexual Activity    Alcohol use: Yes     Alcohol/week: 2.0 standard drinks of alcohol     Types: 2 Cans of beer per week    Drug use: Never    Sexual activity: Not on file   Other Topics Concern    Not on file   Social History Narrative    Not on file     Social Drivers of Health     Financial Resource Strain: Not on file   Food Insecurity: Not on file   Transportation Needs: Not on file   Physical Activity: Not on file   Stress: Not on file   Social Connections: Not on file   Intimate Partner Violence: Not on file   Housing Stability: Not on file     Vitals:  There were no vitals filed for this visit.    Padmini Gerard, APRN-CNS

## 2025-04-01 ENCOUNTER — OFFICE VISIT (OUTPATIENT)
Facility: CLINIC | Age: 60
End: 2025-04-01
Payer: MEDICARE

## 2025-04-01 VITALS
TEMPERATURE: 98.1 F | OXYGEN SATURATION: 95 % | SYSTOLIC BLOOD PRESSURE: 160 MMHG | DIASTOLIC BLOOD PRESSURE: 103 MMHG | HEART RATE: 86 BPM | BODY MASS INDEX: 34.27 KG/M2 | HEIGHT: 74 IN | WEIGHT: 267 LBS

## 2025-04-01 DIAGNOSIS — R06.83 SNORING: ICD-10-CM

## 2025-04-01 DIAGNOSIS — R09.02 HYPOXIA: ICD-10-CM

## 2025-04-01 DIAGNOSIS — J98.6 CHRONICALLY ELEVATED HEMIDIAPHRAGM: ICD-10-CM

## 2025-04-01 DIAGNOSIS — J98.6 ACQUIRED ELEVATED DIAPHRAGM: ICD-10-CM

## 2025-04-01 DIAGNOSIS — G47.19 EXCESSIVE DAYTIME SLEEPINESS: ICD-10-CM

## 2025-04-01 DIAGNOSIS — R06.09 DYSPNEA ON EXERTION: Primary | ICD-10-CM

## 2025-04-01 RX ORDER — ALBUTEROL SULFATE 0.83 MG/ML
3 SOLUTION RESPIRATORY (INHALATION) ONCE
Status: CANCELLED | OUTPATIENT
Start: 2025-04-01 | End: 2025-04-01

## 2025-04-01 RX ORDER — ALBUTEROL SULFATE 90 UG/1
1 INHALANT RESPIRATORY (INHALATION) ONCE
Status: CANCELLED | OUTPATIENT
Start: 2025-04-01

## 2025-04-01 ASSESSMENT — ENCOUNTER SYMPTOMS
OCCASIONAL FEELINGS OF UNSTEADINESS: 1
APNEA: 1
WHEEZING: 1
SHORTNESS OF BREATH: 1
DEPRESSION: 1
SPEECH DIFFICULTY: 1
LOSS OF SENSATION IN FEET: 1

## 2025-04-01 ASSESSMENT — PATIENT HEALTH QUESTIONNAIRE - PHQ9
SUM OF ALL RESPONSES TO PHQ9 QUESTIONS 1 AND 2: 0
2. FEELING DOWN, DEPRESSED OR HOPELESS: NOT AT ALL
1. LITTLE INTEREST OR PLEASURE IN DOING THINGS: NOT AT ALL

## 2025-04-01 ASSESSMENT — PAIN SCALES - GENERAL: PAINLEVEL_OUTOF10: 6

## 2025-04-01 NOTE — PATIENT INSTRUCTIONS
Chronic elevated diaphragm   - will check Mip/Mep  - will check sniff test    Dyspnea/wheezing shortness of breath  - Obtain pulmonary function test, FENO and 6 minute walk test      Snoring, daytime fatigue   - in lab sleep test         Thank you for visiting the Pulmonary clinic today!       Return to clinic after 6-8  weeks and after PFTs, CT scan complete  or sooner if needed   Melia Knox CNP  My office number is [phone] -     Call to schedule  for radiology - CT scans/PFTs etc at  [phone]  General scheduling  [phone]     Best way to get a hold of me is to call my office --> Please do not send me follow my health messages  Any test results will be discussed at next visit -- please make sure to make a follow up appt after testing.

## 2025-04-01 NOTE — PROGRESS NOTES
" Patient: Andrey Drake    [id number]  : 1965 -- AGE 59 y.o.    Provider: JEREMIAS Johnson     Location [facility]   Service Date: 2025              [facility]  New Visit Note      HISTORY OF PRESENT ILLNESS     The patient's referring provider is: Zhang Lara DO    HISTORY OF PRESENT ILLNESS   Andrey Drake \"Miguel" is a 59 y.o. male who presents to a [facility] for an evaluation with concerns of Shortness of Breath (Wheezing, shortness of breath). I have independently interviewed and examined the patient in the office and reviewed available records.    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 He has persistent headache, imbalance and cognitive complaints status post acoustic neuroma surgery in 2022 and subsequent CSF leak.     On today's visit, the patient reports had meningitis and brain tumor. He has been shortness ofbreath since. He had nasal tube placed. Tired all the time.  He snores and feel tired. He sleeps a lot during the falls asleep sitting. Has headaches and BP issues he is working with neurology. He sweats a lot. He is shortness of breath with exertion and 1 flight of stairs. And after a couple minutes and up a hill.     Has throat clearing cough if he breaths deep coughs, slight wheeze  At baseline,  has dyspnea on exertion, Currently sits for most of the day, works inside the house, but does not carry loads and do strenuous exercise. The has to stop for breath when walking at his own pace on level ground at about 15 " minutes (mMRC 2).  He went to vestibular therapy for 6 months but not strength training.  Relates  orthopnea, pnd, or geraldine.  Has gained  X 10 pounds in the last X 12 months. Denies  chronic cough, Occ wheezing, and denies sputum. No night cough. No hemoptysis. No fever or shivering chills.Has no runny nose, or a tingling sensation in the back of his throat.Denies chest pain or heartburn. .    Previous pulmonary history:  no history of recurrent infections, or lung disease as a child.  No previous lung hx, never on oxygen or inhaler therapy.     Inhalers/nebulized medications: [facility]    Hospitalization History: Not been hospitalized over the last year for breathing related problem.    Sleep history:  Complains of snoring, apnea, feeling tired during the day, and taking naps during the day.       ALLERGIES AND MEDICATIONS     ALLERGIES  Allergies   Allergen Reactions    Other Other       MEDICATIONS  Current Outpatient Medications   Medication Sig Dispense Refill    acetaminophen (TylenoL) 325 mg capsule Take by mouth.      acetaZOLAMIDE (Diamox) 125 mg tablet Take 1 tablet (125 mg) by mouth 2 times a day. 180 tablet 2    ipratropium (Atrovent) 21 mcg (0.03 %) nasal spray SPRAY 2 SPRAYS INTO EACH NOSTRIL 3 TIMES A DAY AS NEEDED      methocarbamol (Robaxin) 500 mg tablet 2 tablet EVERY 8 HOURS (route: oral)      rizatriptan (Maxalt) 10 mg tablet TAKE 1 TABLET AT ONSET OF HEADACHE MAY REPEAT EVERY 2 HOURS AS NEEDED. MAX 3 TABS/24 HRS      rosuvastatin (Crestor) 5 mg tablet Take 1 tablet (5 mg) by mouth once daily. 90 tablet 3    sertraline (Zoloft) 100 mg tablet Take 2 tablets (200 mg) by mouth once daily. 180 tablet 1    SUMAtriptan (Imitrex) 50 mg tablet Take 1 tablet (50 mg) by mouth 1 time if needed for migraine. May repeat dose once in 2 hours if no relief.  Do not exceed 2 doses in 24 hours. 9 tablet 6    tetrahydrozoline-zinc (Visine-AC) 0.05-0.25 % ophthalmic solution Administer into affected eye(s) twice a day.       traZODone (Desyrel) 50 mg tablet 1-2 tablets daily at bedtime as needed for sleep 180 tablet 1    valsartan (Diovan) 80 mg tablet Take 1 tablet (80 mg) by mouth once daily. 90 tablet 1    verapamil (Calan) 80 mg tablet Take 2 tablets (160 mg) by mouth 2 times a day. 360 tablet 3    Vitamin B-2 100 mg tablet tablet Take 2 tablets (200 mg) by mouth 2 times a day.      white petrolatum-mineral oiL 94-3 % ophthalmic ointment Apply to affected eye(s).      amoxicillin-pot clavulanate (Augmentin) 875-125 mg tablet Take 1 tablet (875 mg) by mouth 2 times a day. (Patient not taking: Reported on 4/1/2025) 20 tablet 0     Current Facility-Administered Medications   Medication Dose Route Frequency Provider Last Rate Last Admin    ipratropium-albuteroL (Duo-Neb) 0.5-2.5 mg/3 mL nebulizer solution 3 mL  3 mL nebulization Once Courtney Woodward MD             PAST HISTORY     PAST MEDICAL HISTORY  He  has a past medical history of Personal history of other diseases of the nervous system and sense organs, Personal history of other specified conditions, and Unspecified nonsuppurative otitis media, right ear (10/21/2020).      PAST SURGICAL HISTORY  Past Surgical History:   Procedure Laterality Date    OTHER SURGICAL HISTORY  08/19/2020    Shoulder surgery    OTHER SURGICAL HISTORY  01/20/2022    Craniotomy       IMMUNIZATION HISTORY  Immunization History   Administered Date(s) Administered    Flu vaccine, trivalent, preservative free, age 6 months and greater (Fluarix/Fluzone/Flulaval) 09/04/2024    Hepatitis A vaccine, age 19 years and greater (HAVRIX) 02/10/2016    MMR vaccine, subcutaneous (MMR II) 02/10/2016    Moderna SARS-CoV-2 Vaccination 03/18/2021, 04/15/2021, 12/08/2021    Tdap vaccine, age 7 year and older (BOOSTRIX, ADACEL) 02/10/2016    Tetanus toxoid, adsorbed 02/20/2004    Typhoid, ViCPs 02/10/2016    Yellow Fever 02/10/2016    Zoster vaccine, recombinant, adult (SHINGRIX) 03/18/2024, 07/02/2024       SOCIAL  HISTORY  He  reports that he has never smoked. He has never been exposed to tobacco smoke. He has never used smokeless tobacco. He reports current alcohol use of about 2.0 standard drinks of alcohol per week. He reports that he does not use drugs. He Patient     OCCUPATIONAL/ENVIRONMENTAL HISTORY  Previously worked as:[employer]  DOES/DOES NOT EC: does not have known exposure to asbestos, silica, beryllium or inhaled metals.  DOES/DOES NOT EC: does have exposure to birds or exotic animals. Pets - dogs; Birds 10 years ago     FAMILY HISTORY  Family History   Problem Relation Name Age of Onset    No Known Problems Mother      Hypotension Other       DOES/DOES NOT EC: does not have a family history of pulmonary disease. Father  - was a smoker   DOES/DOES NOT EC: does have a family history of cancer. Sister brain tumor brother cancer   DOES/DOES NOT EC: does not have a family history of autoimmune disorders.    RESULTS/DATA     Pulmonary Function Test Results     No testing done     Chest Radiograph     XR CHEST 1 VIEW 2022  Impression  1. No significant interval change in left basilar and retrocardiac  opacity, likely due to left basilar pleural effusion and atelectasis  with infectious infiltrate not excluded in appropriate clinical  setting.      Chest CT Scan     CT CARDIAC SCORING WO IV CONTRAST;  2024           FINDINGS:  The score and distribution of calcium in the coronary arteries is as  follows:      LM 0,  LAD 85.63,  LCx 0,  RCA 0,      Total 85.63      The visualized mid/lower ascending thoracic aorta measures 3.4 cm in  diameter. The heart is normal in size. No pericardial effusion is  present.      No gross mediastinal or hilar lymphadenopathy or mass is identified.  The visualized segments of the lungs are clear with mild chronic  elevation of the right side of the diaphragm.      The visualized subdiaphragmatic structures appear normal.      IMPRESSION:  1. Coronary artery  "calcium score of 85.63*.      *Coronary artery calcium scoring may be helpful in predicting the  risk for future coronary heart disease events.  According to the  American College [employer] Foundation Clinical Expert Consensus  Task Force, such testing provides important prognostic information in  patients with more than one coronary heart disease risk factor. The  coronary artery calcium score correlates with the annual risk of a  non-fatal myocardial infarction or coronary heart disease death.      Coronary artery score            Annual Risk      0-99                             0.4%  100-399                        1.3%  >400                            2.4%        Echocardiogram     No testing done          REVIEW OF SYSTEMS     REVIEW OF SYSTEMS  Review of Systems   Respiratory:  Positive for apnea, shortness of breath and wheezing.    Musculoskeletal:  Positive for gait problem.        Ambulates with a cane    Neurological:  Positive for speech difficulty.   All other systems reviewed and are negative.        PHYSICAL EXAM     VITAL SIGNS: BP (!) 160/103   Pulse 86   Temp 36.7 °C (98.1 °F) (Temporal)   Ht 1.88 m (6' 2\")   Wt 121 kg (267 lb)   SpO2 95%   BMI 34.28 kg/m²      CURRENT WEIGHT: [unfilled]  BMI: [unfilled]  PREVIOUS WEIGHTS:  Wt Readings from Last 3 Encounters:   04/01/25 121 kg (267 lb)   02/24/25 69.9 kg (154 lb)   12/03/24 108 kg (238 lb)       Physical Exam  Constitutional:       Appearance: Normal appearance.   HENT:      Head: Normocephalic and atraumatic.      Right Ear: External ear normal.      Left Ear: External ear normal.      Nose: Nose normal.      Mouth/Throat:      Mouth: Mucous membranes are moist.      Pharynx: Oropharynx is clear.   Eyes:      Extraocular Movements: Extraocular movements intact.      Conjunctiva/sclera: Conjunctivae normal.      Pupils: Pupils are equal, round, and reactive to light.   Cardiovascular:      Rate and Rhythm: Normal rate and regular rhythm.      " Pulses: Normal pulses.      Heart sounds: Normal heart sounds.   Pulmonary:      Effort: Pulmonary effort is normal.      Breath sounds: Normal breath sounds.   Abdominal:      General: Bowel sounds are normal.      Palpations: Abdomen is soft.   Musculoskeletal:         General: Normal range of motion.      Cervical back: Normal range of motion and neck supple.   Skin:     General: Skin is warm and dry.   Neurological:      General: No focal deficit present.      Mental Status: He is alert and oriented to person, place, and time. Mental status is at baseline.   Psychiatric:         Mood and Affect: Mood normal.         Behavior: Behavior normal.         Thought Content: Thought content normal.         Judgment: Judgment normal.         ASSESSMENT/PLAN     Mr. Drake is a 59 y.o. male and  has a past medical history of Personal history of other diseases of the nervous system and sense organs, Personal history of other specified conditions, and Unspecified nonsuppurative otitis media, right ear (10/21/2020). He was referred to the [facility] for evaluation of dyspnea, wheezing right sided eevated diaphragm     Problem List and Orders      Assessment and Plan / Recommendations:  Problem List Items Addressed This Visit       Chronically elevated hemidiaphragm    Hypoxia    Dyspnea on exertion               Chronic right sided elevated diaphragm seen on ct cardiac scoring   - will check Mip/Mep  - will check sniff test    Dyspnea/wheezing shortness of breath  - Obtain pulmonary function test, FENO and 6 minute walk test    Presents to clinic today with O2 sat _97__% on room air.  Oxywalk completed. 94____% ambulating on room air.    Snoring, daytime excessive sleeping   - in lab sleep test       Thank you for visiting the Pulmonary clinic today!       Return to clinic after 6-8  weeks and after PFTs, CT scan complete  or sooner if needed   Melia Knox CNP  My office number is  [phone] -     Call to schedule  for radiology - CT scans/PFTs etc at  [phone]  General scheduling  [phone]     Best way to get a hold of me is to call my office --> Please do not send me follow my health messages  Any test results will be discussed at next visit -- please make sure to make a follow up appt after testing.

## 2025-04-02 ENCOUNTER — HOSPITAL ENCOUNTER (OUTPATIENT)
Dept: RADIOLOGY | Facility: HOSPITAL | Age: 60
Discharge: HOME | End: 2025-04-02
Payer: COMMERCIAL

## 2025-04-02 DIAGNOSIS — J98.6 CHRONICALLY ELEVATED HEMIDIAPHRAGM: ICD-10-CM

## 2025-04-02 LAB
BASOPHILS # BLD AUTO: 37 CELLS/UL (ref 0–200)
BASOPHILS NFR BLD AUTO: 0.6 %
EOSINOPHIL # BLD AUTO: 68 CELLS/UL (ref 15–500)
EOSINOPHIL NFR BLD AUTO: 1.1 %
ERYTHROCYTE [DISTWIDTH] IN BLOOD BY AUTOMATED COUNT: 12.7 % (ref 11–15)
HCT VFR BLD AUTO: 49.6 % (ref 38.5–50)
HGB BLD-MCNC: 16.5 G/DL (ref 13.2–17.1)
LYMPHOCYTES # BLD AUTO: 2201 CELLS/UL (ref 850–3900)
LYMPHOCYTES NFR BLD AUTO: 35.5 %
MCH RBC QN AUTO: 31.9 PG (ref 27–33)
MCHC RBC AUTO-ENTMCNC: 33.3 G/DL (ref 32–36)
MCV RBC AUTO: 95.9 FL (ref 80–100)
MONOCYTES # BLD AUTO: 515 CELLS/UL (ref 200–950)
MONOCYTES NFR BLD AUTO: 8.3 %
NEUTROPHILS # BLD AUTO: 3379 CELLS/UL (ref 1500–7800)
NEUTROPHILS NFR BLD AUTO: 54.5 %
PLATELET # BLD AUTO: 216 THOUSAND/UL (ref 140–400)
PMV BLD REES-ECKER: 10.1 FL (ref 7.5–12.5)
PSA SERPL-MCNC: 0.26 NG/ML
RBC # BLD AUTO: 5.17 MILLION/UL (ref 4.2–5.8)
VIT B12 SERPL-MCNC: 313 PG/ML (ref 200–1100)
WBC # BLD AUTO: 6.2 THOUSAND/UL (ref 3.8–10.8)

## 2025-04-02 PROCEDURE — 76000 FLUOROSCOPY <1 HR PHYS/QHP: CPT

## 2025-04-04 ENCOUNTER — OFFICE VISIT (OUTPATIENT)
Dept: NEUROLOGY | Facility: CLINIC | Age: 60
End: 2025-04-04
Payer: MEDICARE

## 2025-04-04 DIAGNOSIS — R51.9 CHRONIC INTRACTABLE HEADACHE, UNSPECIFIED HEADACHE TYPE: ICD-10-CM

## 2025-04-04 DIAGNOSIS — G89.29 CHRONIC INTRACTABLE HEADACHE, UNSPECIFIED HEADACHE TYPE: ICD-10-CM

## 2025-04-04 PROCEDURE — 99215 OFFICE O/P EST HI 40 MIN: CPT | Performed by: STUDENT IN AN ORGANIZED HEALTH CARE EDUCATION/TRAINING PROGRAM

## 2025-04-04 RX ORDER — INDOMETHACIN 25 MG/1
CAPSULE ORAL
Qty: 130 CAPSULE | Refills: 0 | Status: SHIPPED | OUTPATIENT
Start: 2025-04-04

## 2025-04-04 RX ORDER — PANTOPRAZOLE SODIUM 40 MG/1
40 TABLET, DELAYED RELEASE ORAL
Qty: 90 TABLET | Refills: 1 | Status: SHIPPED | OUTPATIENT
Start: 2025-04-04 | End: 2025-07-03

## 2025-04-04 RX ORDER — ZOSTER VACCINE RECOMBINANT, ADJUVANTED 50 MCG/0.5
KIT INTRAMUSCULAR
COMMUNITY
Start: 2024-07-02

## 2025-04-04 ASSESSMENT — PATIENT HEALTH QUESTIONNAIRE - PHQ9
3. TROUBLE FALLING OR STAYING ASLEEP OR SLEEPING TOO MUCH: NEARLY EVERY DAY
10. IF YOU CHECKED OFF ANY PROBLEMS, HOW DIFFICULT HAVE THESE PROBLEMS MADE IT FOR YOU TO DO YOUR WORK, TAKE CARE OF THINGS AT HOME, OR GET ALONG WITH OTHER PEOPLE: EXTREMELY DIFFICULT
9. THOUGHTS THAT YOU WOULD BE BETTER OFF DEAD, OR OF HURTING YOURSELF: NOT AT ALL
4. FEELING TIRED OR HAVING LITTLE ENERGY: NEARLY EVERY DAY
2. FEELING DOWN, DEPRESSED OR HOPELESS: MORE THAN HALF THE DAYS
5. POOR APPETITE OR OVEREATING: SEVERAL DAYS
7. TROUBLE CONCENTRATING ON THINGS, SUCH AS READING THE NEWSPAPER OR WATCHING TELEVISION: NEARLY EVERY DAY
SUM OF ALL RESPONSES TO PHQ QUESTIONS 1-9: 20
8. MOVING OR SPEAKING SO SLOWLY THAT OTHER PEOPLE COULD HAVE NOTICED. OR THE OPPOSITE, BEING SO FIGETY OR RESTLESS THAT YOU HAVE BEEN MOVING AROUND A LOT MORE THAN USUAL: NEARLY EVERY DAY
1. LITTLE INTEREST OR PLEASURE IN DOING THINGS: MORE THAN HALF THE DAYS
6. FEELING BAD ABOUT YOURSELF - OR THAT YOU ARE A FAILURE OR HAVE LET YOURSELF OR YOUR FAMILY DOWN: NEARLY EVERY DAY
SUM OF ALL RESPONSES TO PHQ9 QUESTIONS 1 AND 2: 4

## 2025-04-04 ASSESSMENT — ENCOUNTER SYMPTOMS
OCCASIONAL FEELINGS OF UNSTEADINESS: 1
LOSS OF SENSATION IN FEET: 0
DEPRESSION: 0

## 2025-04-04 NOTE — PATIENT INSTRUCTIONS
Differential:  Hemicrania Continua  Chronic migraine wihtout aura  New Daily persistent headaches    Follow up in 4-6 weeks    Please increase indomethacin on this schedule:    25mg three times a day for 1 week  then increase to 50mg three times a day for 1 week  then increase to 75mg three times a day for 1 week    To diagnose hemicrania, you must have 100% resolution of your headaches. If at any point during this schedule you have complete resolution of your headaches, please stay at that dose.     Most common side effects include bleeding and gastric ulcers. Please monitor to make sure your are not exhibiting these side effects.

## 2025-04-04 NOTE — PROGRESS NOTES
Subjective     Chief Complaint: Headache    Andrey Drake is a 59 y.o. year old male who presents with chief complaint of headaches.    Andrey started getting headaches at age 22s. Headaches gradually worsening in frequency and severity. Currently having 2/30 HA days per month. The headaches are usually squeezing and are located frontal and temporal, generally symmetric. The patient rates her most severe headaches a 3 in intensity. Generally, headaches last about 3 hours in duration. No associated nausea, photophobia, and phonophobia. Headaches are worsened with exertion. Triggers include barometric pressure  and stress related.    Had a surgery in 1/2022, had R schwannoma surgery. Had an immediate headache and facial pain. Developed meningitis within a week. Had worsening headaches, light sensitivity, and fatigue. Admitted and meningitis was treated. Had a subsequent washout surgery. Headaches were continuous since that time. Pain is primarily R occipital, temporal, and frontal. Pain is achy/throbbing in quality. Pain is up to a 10/10 in severity. Pain is side locked to the R. Has not had any crystal clear headache free time. Has tearing of R eye, swelling of the face, rhinorrhea of the R nare.      No clear aura.     No hx of bleeding.     Current Acute Headache Treatment Rizatriptan  Sumatriptan  (Relieve headaches SE: grogginess)   Current Preventative Headache Treatment Verapamil   Acetazolamide   Previous Acute Headache Treatment Ubrelvy (relieved but did not resolve)   Previous Preventative Headache Treatment Topiramate (SE: cognitive fog)  Ajovy (expensive, maybe helped a little)       ROS: As per HPI, otherwise all other systems have been reviewed are negative for complaint.     Past Medical History:   Diagnosis Date    Personal history of other diseases of the nervous system and sense organs     History of hearing loss    Personal history of other specified conditions     History of snoring     Unspecified nonsuppurative otitis media, right ear 10/21/2020    Fluid level behind tympanic membrane of right ear     Past Surgical History:   Procedure Laterality Date    OTHER SURGICAL HISTORY  08/19/2020    Shoulder surgery    OTHER SURGICAL HISTORY  01/20/2022    Craniotomy     Family History   Problem Relation Name Age of Onset    No Known Problems Mother      Hypotension Other      Migraines Neg Hx       Social History     Tobacco Use    Smoking status: Never     Passive exposure: Never    Smokeless tobacco: Never   Substance Use Topics    Alcohol use: Yes     Alcohol/week: 2.0 standard drinks of alcohol     Types: 2 Cans of beer per week     Comment: socially        Objective   There were no vitals taken for this visit.    Neuro Exam:  Cardiac Exam: No apparent edema of b/l lower extremities  Neurological Exam:  MENTAL STATUS:   General Appearance: No distress, alert, interactive, and cooperative. Orientation was normal to time, place and person. Recent and remote memory was intact.     OPHTHALMOSCOPIC:   The ophthalmoscopic exam was normal. The fundi were well visualized with normal disc margins, clear vessels and vascular pulsations. No disc edema. The cup/disk ratio was not enlarged. No hemorrhages or exudates were present in the posterior segments that were visualized.     CRANIAL NERVES:   CN 2         Visual fields full to confrontation.   CN 3, 4, 6   Pupils round, 4 mm in diameter, equally reactive to light. Lids symmetric; no ptosis. EOMs normal alignment, full range with normal saccades, pursuit and convergence.   No nystagmus.   CN 5   Facial sensation intact bilaterally.   CN 7   Normal and symmetric facial strength. Nasolabial folds symmetric.   CN 8   Hearing intact to conversation and finger rub on L, Absent in R ear.  CN 9, 10   Palate elevates symmetrically.  CN 11   Normal strength of shoulder shrug and neck turning.   CN 12   Tongue midline, with normal bulk and strength; no  fasciculations.     MOTOR:   Muscle bulk and tone were normal in both upper and lower extremities.   No pronator drift bilaterally.  Slow to start complex movements, but once started no problem    STRENGTH:  R  L  Deltoid            5          5  Biceps  5 5  Triceps  5 5    Hip flexion 5 5  Knee Flex 5 5  Knee Ex 5 5    REFLEXES: R L  Biceps  2 2                     Triceps  2 2  Patellar  2 2     SENSORY:   In both upper and lower extremities, sensation was intact to light touch.    COORDINATION:   In both upper extremities, finger-nose-finger was intact without dysmetria or overshoot.     GAIT:   Station was stable with a normal base. Gait was stable with a normal arm swing and speed. No ataxia, shuffling, steppage or waddling was present. No circumduction was present. No Romberg sign was present.    Assessment/Plan   Given the description and frequency of headaches, differential diagnosis includes hemicrania continua, NDPH, and chronic migraine without aura. Will do an indomethacin trial. If not effective, will treat as migraine.    - Indomethacin trial  - start pantoprazole 40mg daily  - follow up in 4-6 weeks    I personally spent 63 minutes today, exclusive of procedures, providing care for this patient, including preparation, face to face time, documentation and other services such as review of medical records, diagnostic result, patient education, counseling, coordination of care as specified in the encounter.

## 2025-04-04 NOTE — PROGRESS NOTES
Subjective     Andrey Drake is a 59 y.o. 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     He remained in a daily headache pattern and endorsed persistent photophobia. He noted mild improvement with verapamil titration and I recommended increasing to 160 mg twice daily but first checking an EKG. He continued in vestibular therapy and continued to feel somewhat off balance. I reviewed the disability form with him.     He had noted only modest improvement in headaches with verapamil titration but we discussed further titration to 160 mg twice daily after checking an EKG. I was hesitant to recommend topiramate given his speech/cognitive issues. I referred him to [facility] for a headache subspecialty opinion and further management.     HPI    Review of Systems    Patient Active Problem List   Diagnosis   • Major depression   • Depression   • Crocodile tears syndrome   • Cognitive dysfunction   • Chronic tonsillitis   • Chronic otitis media with effusion   • Chronic mastoiditis of right side   • Attention deficit disorder   • Asymmetric tonsils   • Left-sided sensorineural hearing loss   • Asymmetric SNHL (sensorineural hearing loss)   • Anxiety reaction   • Acoustic neuroma (Multi)   • Other long term (current) drug therapy   • Nausea and/or vomiting   • Mixed hearing loss of right ear   • Meningitis due to gram-negative bacteria (HHS-HCC)   • Meningitis (HHS-HCC)   • Lumbago   • Insomnia   • Infection associated with neurological device   • Imbalance   • Hypokalemia   • Fungal meningitis (HHS-HCC)   • Expressive aphasia   • Dryness of right eye due to decreased tear production   • Vertigo   • Dizziness   • Other cranial cerebrospinal fluid leak   • Hyperlipidemia   • Encounter for adjustment and management of vascular access device   • Other abnormalities  of gait and mobility   • Oth bacterial agents as the cause of diseases classd elswhr   • Unsteady gait   • Tinnitus, right   • Thrush, oral   • Sensorineural hearing loss (SNHL) of left ear with restricted hearing of right ear   • Rhinorrhea   • Retraction of tympanic membrane of left ear   • Pressure sensation in right ear   • Persistent headaches   • Persistent cough for 3 weeks or longer   • Otitis externa   • Long term (current) use of antibiotics   • Facial weakness   • Primary hypertension   • Class 1 obesity due to excess calories with serious comorbidity and body mass index (BMI) of 34.0 to 34.9 in adult   • Accidental fall   • Acute thoracic back pain   • Common migraine with intractable migraine   • Erectile dysfunction   • Exposure to severe acute respiratory syndrome coronavirus 2 (SARS-CoV-2)   • Fatigue   • History of acoustic neuroma   • History of hearing loss   • Infarction of testicle   • Numbness and tingling sensation of skin   • Pain of joint of both hands   • Persistent pain in testicle   • Spermatic cord inflammation   • Weight gain   • Encounter for weight loss counseling   • Agatston coronary artery calcium score less than 100   • Health maintenance examination   • Injury of tendon of foot   • Foot injury, left, initial encounter   • Left foot pain   • Chronically elevated hemidiaphragm   • Hypoxia   • Dyspnea on exertion   • Prostate cancer screening   • Low vitamin B12 level   • Colon cancer screening   • Need for influenza vaccination     Past Medical History:   Diagnosis Date   • Personal history of other diseases of the nervous system and sense organs     History of hearing loss   • Personal history of other specified conditions     History of snoring   • Unspecified nonsuppurative otitis media, right ear 10/21/2020    Fluid level behind tympanic membrane of right ear     Past Surgical History:   Procedure Laterality Date   • OTHER SURGICAL HISTORY  08/19/2020    Shoulder surgery   •  OTHER SURGICAL HISTORY  01/20/2022    Craniotomy     Social History     Tobacco Use   • Smoking status: Never     Passive exposure: Never   • Smokeless tobacco: Never   Substance Use Topics   • Alcohol use: Yes     Alcohol/week: 2.0 standard drinks of alcohol     Types: 2 Cans of beer per week     family history includes Hypotension in an other family member; No Known Problems in his mother.    Current Outpatient Medications:   •  acetaminophen (TylenoL) 325 mg capsule, Take by mouth., Disp: , Rfl:   •  acetaZOLAMIDE (Diamox) 125 mg tablet, Take 1 tablet (125 mg) by mouth 2 times a day., Disp: 180 tablet, Rfl: 2  •  amoxicillin-pot clavulanate (Augmentin) 875-125 mg tablet, Take 1 tablet (875 mg) by mouth 2 times a day. (Patient not taking: Reported on 4/1/2025), Disp: 20 tablet, Rfl: 0  •  ipratropium (Atrovent) 21 mcg (0.03 %) nasal spray, SPRAY 2 SPRAYS INTO EACH NOSTRIL 3 TIMES A DAY AS NEEDED, Disp: , Rfl:   •  methocarbamol (Robaxin) 500 mg tablet, 2 tablet EVERY 8 HOURS (route: oral), Disp: , Rfl:   •  rizatriptan (Maxalt) 10 mg tablet, TAKE 1 TABLET AT ONSET OF HEADACHE MAY REPEAT EVERY 2 HOURS AS NEEDED. MAX 3 TABS/24 HRS, Disp: , Rfl:   •  rosuvastatin (Crestor) 5 mg tablet, Take 1 tablet (5 mg) by mouth once daily., Disp: 90 tablet, Rfl: 3  •  sertraline (Zoloft) 100 mg tablet, Take 2 tablets (200 mg) by mouth once daily., Disp: 180 tablet, Rfl: 1  •  SUMAtriptan (Imitrex) 50 mg tablet, Take 1 tablet (50 mg) by mouth 1 time if needed for migraine. May repeat dose once in 2 hours if no relief.  Do not exceed 2 doses in 24 hours., Disp: 9 tablet, Rfl: 6  •  tetrahydrozoline-zinc (Visine-AC) 0.05-0.25 % ophthalmic solution, Administer into affected eye(s) twice a day., Disp: , Rfl:   •  traZODone (Desyrel) 50 mg tablet, 1-2 tablets daily at bedtime as needed for sleep, Disp: 180 tablet, Rfl: 1  •  valsartan (Diovan) 80 mg tablet, Take 1 tablet (80 mg) by mouth once daily., Disp: 90 tablet, Rfl: 1  •   verapamil (Calan) 80 mg tablet, Take 2 tablets (160 mg) by mouth 2 times a day., Disp: 360 tablet, Rfl: 3  •  Vitamin B-2 100 mg tablet tablet, Take 2 tablets (200 mg) by mouth 2 times a day., Disp: , Rfl:   •  white petrolatum-mineral oiL 94-3 % ophthalmic ointment, Apply to affected eye(s)., Disp: , Rfl:     Current Facility-Administered Medications:   •  ipratropium-albuteroL (Duo-Neb) 0.5-2.5 mg/3 mL nebulizer solution 3 mL, 3 mL, nebulization, Once, Courtney Woodwadr MD  Allergies   Allergen Reactions   • Other Other       Objective   Neurological Exam  Physical Exam  GENERAL APPEARANCE:  No distress, alert and cooperative.     CARDIOVASCULAR: Regular, rate and rhythm, without murmur. No carotid bruits. Pulses +2 and equal in all extremities. No edema, or tenderness to palpation.    MENTAL STATE:  Orientation was normal to time, place and person. Recent and remote memory was intact.  Attention span and concentration were normal. Language testing was normal for comprehension, repetition, expression, and naming. Calculation was intact. The patient could correctly interpret a picture, and copy a diagram. General fund of knowledge was intact. Mini-mental status examination was performed with no errors.     OPHTHALMOSCOPIC: The ophthalmoscopic exam normal. The fundi were well visualized with normal disc margins, clear vessels and vascular pulsations. No disc edema. The cup/disk ratio was not enlarged. No hemorrhages or exudates were present in the posterior segments that were visualized.     CRANIAL NERVES:  Cranial nerves were normal.      CN 2- Visual Acuity  OD: 20/20 (corrected)   OS: 20/20 (corrected); visual fields full to confrontation.      CN 3, 4, 6-  Pupils round, 4 mm in diameter, equally reactive to light. No ptosis. EOMs normal alignment, full range of movement, no nystagmus     CN 5- Facial sensation intact bilaterally. Normal corneal reflexes.      CN 7- Normal and symmetric facial strength.  Nasolabial folds symmetric.     CN 8- Hearing intact to finger rub, whisper.      CN 9- Palate elevates symmetrically. Normal gag reflex.      CN 11- Normal strength of shoulder shrug and neck turning      CN 12- Tongue midline, with normal bulk and strength; no fasciculations.     MOTOR:  Motor exam was normal. Muscle bulk and tone were normal in both upper and lower extremities. Muscle strength was 5/5 in distal and proximal muscles in both upper and lower extremities. No fasciculations, tremor or other abnormal movements were present.     REFLEXES:  Right/ Left:  Biceps 2/2, brachioradialis 2/2, triceps 2/2, patellar 2/2, ankle 2/2  Babinski: toes downgoing to plantar stimulation. No clonus, frontal release signs or other pathologic reflexes present.     SENSORY: Sensory exam was intact to light touch, sharp/dull, vibration and position sense in both UE and LE.     COORDINATION: JODIE were intact in upper and lower extremities.  In UE- finger-nose-finger was intact and in LE- heel-to-shin was intact without dysmetria or overshoot.      GAIT: Station was stable with a normal base and negative Romberg sign. Gait was stable with a normal arm swing and speed. No ataxia, shuffling, steppage or waddling was noted. Tandem gait was intact. Postural reflexes were normal.     Assessment/Plan   {Assess/PlanSmartLinks:89864}

## 2025-04-08 ENCOUNTER — APPOINTMENT (OUTPATIENT)
Facility: CLINIC | Age: 60
End: 2025-04-08
Payer: MEDICARE

## 2025-04-16 ENCOUNTER — HOSPITAL ENCOUNTER (OUTPATIENT)
Dept: RESPIRATORY THERAPY | Facility: HOSPITAL | Age: 60
Discharge: HOME | End: 2025-04-16
Payer: MEDICARE

## 2025-04-16 ENCOUNTER — TELEPHONE (OUTPATIENT)
Dept: PRIMARY CARE | Facility: CLINIC | Age: 60
End: 2025-04-16
Payer: MEDICARE

## 2025-04-16 ENCOUNTER — CLINICAL SUPPORT (OUTPATIENT)
Dept: URGENT CARE | Age: 60
End: 2025-04-16
Payer: MEDICARE

## 2025-04-16 DIAGNOSIS — R06.09 DYSPNEA ON EXERTION: ICD-10-CM

## 2025-04-16 DIAGNOSIS — J98.6 CHRONICALLY ELEVATED HEMIDIAPHRAGM: ICD-10-CM

## 2025-04-16 DIAGNOSIS — R09.02 HYPOXIA: Primary | ICD-10-CM

## 2025-04-16 PROCEDURE — 94618 PULMONARY STRESS TESTING: CPT

## 2025-04-16 PROCEDURE — 94726 PLETHYSMOGRAPHY LUNG VOLUMES: CPT

## 2025-04-16 PROCEDURE — 94799 UNLISTED PULMONARY SVC/PX: CPT

## 2025-04-16 PROCEDURE — 2500000002 HC RX 250 W HCPCS SELF ADMINISTERED DRUGS (ALT 637 FOR MEDICARE OP, ALT 636 FOR OP/ED): Performed by: NURSE PRACTITIONER

## 2025-04-16 PROCEDURE — 94640 AIRWAY INHALATION TREATMENT: CPT

## 2025-04-16 PROCEDURE — 95012 NITRIC OXIDE EXP GAS DETER: CPT

## 2025-04-16 RX ORDER — ALBUTEROL SULFATE 90 UG/1
4 INHALANT RESPIRATORY (INHALATION) ONCE
Status: COMPLETED | OUTPATIENT
Start: 2025-04-16 | End: 2025-04-16

## 2025-04-16 RX ORDER — ALBUTEROL SULFATE 0.83 MG/ML
3 SOLUTION RESPIRATORY (INHALATION) ONCE
Status: COMPLETED | OUTPATIENT
Start: 2025-04-16 | End: 2025-04-16

## 2025-04-16 RX ADMIN — ALBUTEROL SULFATE 3 ML: 2.5 SOLUTION RESPIRATORY (INHALATION) at 12:34

## 2025-04-16 NOTE — TELEPHONE ENCOUNTER
Patient went to get a pulmonary test done today.  His BP was 188/110, Now it is 165/98.  He is on Valsartan 80 mg daily.  He does have an appt with you next week 4/23/2025.   He was advised to keep a diary of His BP.  Should he see someone else sooner?  How high should his BP get before he needs STAT medical attn?

## 2025-04-16 NOTE — PROGRESS NOTES
Patient had 6 minute walk test showing desaturation. when walking he read 86% on Room air, on 2L pulse ox improved. Please see 6 minute walk test

## 2025-04-17 DIAGNOSIS — R09.02 HYPOXIA: Primary | ICD-10-CM

## 2025-04-17 NOTE — TELEPHONE ENCOUNTER
He can take two 80mg Valsartan tablets daily for a total of 160mg daily.  I reviewed his medication list. It looks like Indomethacin medication was recently added and is known to raise blood pressure especially when taken with medications like Valsartan. I would advise stopping Indomethacin. If his bp is 180/120 or greater and has symptoms of chest pain, shortness of breath, numbness, vision changes then he would need to go to the ER.

## 2025-04-20 LAB
MGC ASCENT PFT - FEV1 - POST: 3.54
MGC ASCENT PFT - FEV1 - PRE: 3.28
MGC ASCENT PFT - FEV1 - PREDICTED: 3.97
MGC ASCENT PFT - FVC - POST: 4.47
MGC ASCENT PFT - FVC - PRE: 4.23
MGC ASCENT PFT - FVC - PREDICTED: 5.14

## 2025-04-23 ENCOUNTER — APPOINTMENT (OUTPATIENT)
Dept: PRIMARY CARE | Facility: CLINIC | Age: 60
End: 2025-04-23
Payer: MEDICARE

## 2025-04-30 ASSESSMENT — ENCOUNTER SYMPTOMS
APNEA: 1
WHEEZING: 1
SHORTNESS OF BREATH: 1
SPEECH DIFFICULTY: 1

## 2025-04-30 NOTE — PROGRESS NOTES
" Patient: Andrey Drake    [id number]  : 1965 -- AGE 60 y.o.    Provider: JEREMIAS Johnson     Location [facility]   Service Date: 2025              [facility]  New Visit Note      HISTORY OF PRESENT ILLNESS     The patient's referring provider is: No ref. provider found    HISTORY OF PRESENT ILLNESS   Andrey Drake \"Miguel" is a 60 y.o. male who presents to a [facility] for an evaluation with concerns of No chief complaint on file.. I have independently interviewed and examined the patient in the office and reviewed available records.    Current History 2025    Of note this is a 59-year-old left-handed man with past medical history significant for translabyrinthine resection of right acoustic neuroma in 2022, subsequent course complicated by bacterial and fungal meningitis, left shoulder surgery in , some elevated blood pressure readings historically attributed by patient to anxiety without a formal diagnosis of hypertension. He has persistent headache, imbalance and cognitive complaints status post acoustic neuroma surgery in 2022 and subsequent CSF leak.     On today's visit, the patient reports had meningitis and brain tumor. He has been shortness of breath since. He had nasal tube placed. Tired all the time.  He snores and feel tired. He sleeps a lot during the falls asleep sitting. Has headaches and BP issues he is working with neurology. He sweats a lot. He is shortness of breath with exertion and 1 flight of stairs. And after a couple minutes and up a hill.     Has throat clearing cough if he breaths deep coughs, slight wheeze  At baseline,  has dyspnea on exertion, Currently sits for most of the day, works inside the house, but does not carry loads and do strenuous exercise. The has to stop for breath when walking at his own pace on level ground at about 15 minutes (mMRC " 2).  He went to vestibular therapy for 6 months but not strength training.  Relates  orthopnea, pnd, or geraldine.  Has gained  X 10 pounds in the last X 12 months. Denies  chronic cough, Occ wheezing, and denies sputum. No night cough. No hemoptysis. No fever or shivering chills.Has no runny nose, or a tingling sensation in the back of his throat.Denies chest pain or heartburn. .    Previous pulmonary history:  no history of recurrent infections, or lung disease as a child.  No previous lung hx, never on oxygen or inhaler therapy.     Inhalers/nebulized medications: prashant    Hospitalization History: Not been hospitalized over the last year for breathing related problem.    Sleep history:  Complains of snoring, apnea, feeling tired during the day, and taking naps during the day.         5/13/2025    On today's visit, the patient reports when he is active he is SMITH with any walking.  After activity he sleeps he needs to sleep for 2-3 hrs.  He does get tired easily. He wears 3L with activity with portable and at home wears 2 L. Denies cough. Wheezing, Fevers/chills or chest pain    Occ wearing O2 at night   Denies any respiratory illnesses       ALLERGIES AND MEDICATIONS     ALLERGIES  Allergies   Allergen Reactions    Other Other       MEDICATIONS  Current Outpatient Medications   Medication Sig Dispense Refill    acetaminophen (TylenoL) 325 mg capsule Take by mouth.      acetaZOLAMIDE (Diamox) 125 mg tablet Take 1 tablet (125 mg) by mouth 2 times a day. 180 tablet 2    indomethacin (Indocin) 25 mg capsule Take 25mg three times a day for 1 week, then increase to 50mg three times a day for 1 week, then increase to 75mg three times a day 130 capsule 0    ipratropium (Atrovent) 21 mcg (0.03 %) nasal spray SPRAY 2 SPRAYS INTO EACH NOSTRIL 3 TIMES A DAY AS NEEDED      pantoprazole (ProtoNix) 40 mg EC tablet Take 1 tablet (40 mg) by mouth once daily in the morning. Take before meals. Do not crush, chew, or split. 90 tablet 1     rosuvastatin (Crestor) 5 mg tablet Take 1 tablet (5 mg) by mouth once daily. 90 tablet 3    sertraline (Zoloft) 100 mg tablet Take 2 tablets (200 mg) by mouth once daily. 180 tablet 1    Shingrix, PF, 50 mcg/0.5 mL vaccine       SUMAtriptan (Imitrex) 50 mg tablet Take 1 tablet (50 mg) by mouth 1 time if needed for migraine. May repeat dose once in 2 hours if no relief.  Do not exceed 2 doses in 24 hours. 9 tablet 6    tetrahydrozoline 0.05 % ophthalmic solution Administer into affected eye(s).      tetrahydrozoline-zinc (Visine-AC) 0.05-0.25 % ophthalmic solution Administer into affected eye(s) twice a day.      traZODone (Desyrel) 50 mg tablet 1-2 tablets daily at bedtime as needed for sleep 180 tablet 1    valsartan (Diovan) 80 mg tablet Take 1 tablet (80 mg) by mouth once daily. 90 tablet 1    verapamil (Calan) 80 mg tablet Take 2 tablets (160 mg) by mouth 2 times a day. 360 tablet 3    Vitamin B-2 100 mg tablet tablet Take 2 tablets (200 mg) by mouth 2 times a day. (Patient not taking: Reported on 4/4/2025)      white petrolatum-mineral oiL 94-3 % ophthalmic ointment Apply to affected eye(s).       Current Facility-Administered Medications   Medication Dose Route Frequency Provider Last Rate Last Admin    ipratropium-albuteroL (Duo-Neb) 0.5-2.5 mg/3 mL nebulizer solution 3 mL  3 mL nebulization Once Courtney Woodward MD             PAST HISTORY     PAST MEDICAL HISTORY  He  has a past medical history of Personal history of other diseases of the nervous system and sense organs, Personal history of other specified conditions, and Unspecified nonsuppurative otitis media, right ear (10/21/2020).      PAST SURGICAL HISTORY  Past Surgical History:   Procedure Laterality Date    OTHER SURGICAL HISTORY  08/19/2020    Shoulder surgery    OTHER SURGICAL HISTORY  01/20/2022    Craniotomy       IMMUNIZATION HISTORY  Immunization History   Administered Date(s) Administered    Flu vaccine, trivalent, preservative free, age  6 months and greater (Fluarix/Fluzone/Flulaval) 2024    Hepatitis A vaccine, age 19 years and greater (HAVRIX) 02/10/2016    MMR vaccine, subcutaneous (MMR II) 02/10/2016    Moderna SARS-CoV-2 Vaccination 2021, 04/15/2021, 2021    Tdap vaccine, age 7 year and older (BOOSTRIX, ADACEL) 02/10/2016    Tetanus toxoid, adsorbed 2004    Typhoid, ViCPs 02/10/2016    Yellow Fever 02/10/2016    Zoster vaccine, recombinant, adult (SHINGRIX) 2024, 2024       SOCIAL HISTORY  He  reports that he has never smoked. He has never been exposed to tobacco smoke. He has never used smokeless tobacco. He reports current alcohol use of about 2.0 standard drinks of alcohol per week. He reports that he does not use drugs. He Patient     OCCUPATIONAL/ENVIRONMENTAL HISTORY  Previously worked as:[employer]  DOES/DOES NOT EC: does not have known exposure to asbestos, silica, beryllium or inhaled metals.  DOES/DOES NOT EC: does have exposure to birds or exotic animals. Pets - dogs; Birds 10 years ago     FAMILY HISTORY  Family History   Problem Relation Name Age of Onset    No Known Problems Mother      Hypotension Other      Migraines Neg Hx       DOES/DOES NOT EC: does not have a family history of pulmonary disease. Father  - was a smoker   DOES/DOES NOT EC: does have a family history of cancer. Sister brain tumor brother cancer   DOES/DOES NOT EC: does not have a family history of autoimmune disorders.    RESULTS/DATA     Pulmonary Function Test Results        Complete Pulmonary Function Test Pre/Post Bronchodilator (Spirometry Pre/Post/DLCO/Lung Volumes) 2025  Status: Final result     Scans on Order [id number]    Pulmonary Function Test - Scan on 2025  4:11 PM            Pulmonary Function Test - Scan on 2025 10:50 PM                  Chest Radiograph     XR CHEST 1 VIEW 2022  Impression  1. No significant interval change in left basilar and retrocardiac  opacity, likely  due to left basilar pleural effusion and atelectasis  with infectious infiltrate not excluded in appropriate clinical  setting.      Chest CT Scan     CT CARDIAC SCORING WO IV CONTRAST;  1/2/2024           FINDINGS:  The score and distribution of calcium in the coronary arteries is as  follows:      LM 0,  LAD 85.63,  LCx 0,  RCA 0,      Total 85.63      The visualized mid/lower ascending thoracic aorta measures 3.4 cm in  diameter. The heart is normal in size. No pericardial effusion is  present.      No gross mediastinal or hilar lymphadenopathy or mass is identified.  The visualized segments of the lungs are clear with mild chronic  elevation of the right side of the diaphragm.      The visualized subdiaphragmatic structures appear normal.      IMPRESSION:  1. Coronary artery calcium score of 85.63*.      *Coronary artery calcium scoring may be helpful in predicting the  risk for future coronary heart disease events.  According to the  American College of Cardiology Foundation Clinical Expert Consensus  Task Force, such testing provides important prognostic information in  patients with more than one coronary heart disease risk factor. The  coronary artery calcium score correlates with the annual risk of a  non-fatal myocardial infarction or coronary heart disease death.      Coronary artery score            Annual Risk      0-99                             0.4%  100-399                        1.3%  >400                            2.4%        Echocardiogram     No testing done          REVIEW OF SYSTEMS     REVIEW OF SYSTEMS  Review of Systems   Respiratory:  Positive for apnea, shortness of breath and wheezing.    Musculoskeletal:  Positive for gait problem.        Ambulates with a cane    Neurological:  Positive for speech difficulty.   All other systems reviewed and are negative.        PHYSICAL EXAM     VITAL SIGNS: There were no vitals taken for this visit. /89   Pulse 80   Temp 36.4 °C (97.5 °F)    "Resp 20   Ht 1.88 m (6' 2\")   Wt 118 kg (260 lb 6.4 oz)   SpO2 94%   BMI 33.43 kg/m²      CURRENT WEIGHT: [unfilled]  BMI: [unfilled]  PREVIOUS WEIGHTS:  Wt Readings from Last 3 Encounters:   04/01/25 121 kg (267 lb)   02/24/25 69.9 kg (154 lb)   12/03/24 108 kg (238 lb)       Physical Exam  Constitutional:       Appearance: Normal appearance.   HENT:      Head: Normocephalic and atraumatic.      Right Ear: External ear normal.      Left Ear: External ear normal.      Nose: Nose normal.      Mouth/Throat:      Mouth: Mucous membranes are moist.      Pharynx: Oropharynx is clear.   Eyes:      Extraocular Movements: Extraocular movements intact.      Conjunctiva/sclera: Conjunctivae normal.      Pupils: Pupils are equal, round, and reactive to light.   Cardiovascular:      Rate and Rhythm: Normal rate and regular rhythm.      Pulses: Normal pulses.      Heart sounds: Normal heart sounds.   Pulmonary:      Effort: Pulmonary effort is normal.      Breath sounds: Normal breath sounds.      Comments: Diminished on the right lower base   Abdominal:      General: Bowel sounds are normal.      Palpations: Abdomen is soft.   Musculoskeletal:         General: Normal range of motion.      Cervical back: Normal range of motion and neck supple.   Skin:     General: Skin is warm and dry.   Neurological:      General: No focal deficit present.      Mental Status: He is alert and oriented to person, place, and time. Mental status is at baseline.   Psychiatric:         Mood and Affect: Mood normal.         Behavior: Behavior normal.         Thought Content: Thought content normal.         Judgment: Judgment normal.         ASSESSMENT/PLAN     Mr. Drake is a 60 y.o. male and  has a past medical history of Personal history of other diseases of the nervous system and sense organs, Personal history of other specified conditions, and Unspecified nonsuppurative otitis media, right ear (10/21/2020). [facility] for evaluation of dyspnea, wheezing right sided eevated diaphragm     Problem List and Orders      Assessment and Plan / Recommendations:  Problem List Items Addressed This Visit    None                Chronic right sided elevated diaphragm seen on ct cardiac scoring   - Mip decreased, Mep wnl   -  sniff test - Mildly elevated right hemidiaphragm. Otherwise the hemidiaphragms  move symmetrically without evidence of paralysis  - refer to surgery - Dr. Ken, contacted Mirella Ford for an appt     Dyspnea/wheezing shortness of breath  - pulmonary function test -normal spirometry lung volumes indicate restrictive ventilatory, Impairment diffusion capacity is normal  FENO 15.75  6 minute walk test  - desaturation 86% rra ambulating, on 2 l ambulating 94%      Snoring, daytime excessive sleeping   - in lab sleep test pending - depending on results will followup in 3-6 months       Thank you for visiting the Pulmonary clinic today!       Return to clinic after 12 weeks  or sooner if needed   Melia Knox CNP  My office number is [phone] -     Call to schedule  for radiology - CT scans/PFTs etc at  [phone]  General scheduling  [phone]     Best way to get a hold of me is to call my office --> Please do not send me follow my health messages  Any test results will be discussed at next visit -- please make sure to make a follow up appt after testing.

## 2025-05-12 ENCOUNTER — APPOINTMENT (OUTPATIENT)
Dept: NEUROLOGY | Facility: CLINIC | Age: 60
End: 2025-05-12
Payer: MEDICARE

## 2025-05-13 ENCOUNTER — APPOINTMENT (OUTPATIENT)
Facility: CLINIC | Age: 60
End: 2025-05-13
Payer: MEDICARE

## 2025-05-13 ENCOUNTER — TELEMEDICINE (OUTPATIENT)
Dept: NEUROLOGY | Facility: CLINIC | Age: 60
End: 2025-05-13
Payer: MEDICARE

## 2025-05-13 ENCOUNTER — OFFICE VISIT (OUTPATIENT)
Dept: PRIMARY CARE | Facility: CLINIC | Age: 60
End: 2025-05-13
Payer: MEDICARE

## 2025-05-13 VITALS
TEMPERATURE: 97.5 F | RESPIRATION RATE: 20 BRPM | HEART RATE: 80 BPM | DIASTOLIC BLOOD PRESSURE: 89 MMHG | BODY MASS INDEX: 33.42 KG/M2 | WEIGHT: 260.4 LBS | OXYGEN SATURATION: 94 % | SYSTOLIC BLOOD PRESSURE: 135 MMHG | HEIGHT: 74 IN

## 2025-05-13 VITALS
DIASTOLIC BLOOD PRESSURE: 77 MMHG | SYSTOLIC BLOOD PRESSURE: 120 MMHG | HEART RATE: 83 BPM | HEIGHT: 74 IN | TEMPERATURE: 97.3 F | WEIGHT: 260 LBS | BODY MASS INDEX: 33.37 KG/M2

## 2025-05-13 DIAGNOSIS — R06.09 DYSPNEA ON EXERTION: ICD-10-CM

## 2025-05-13 DIAGNOSIS — Z13.29 THYROID DISORDER SCREENING: ICD-10-CM

## 2025-05-13 DIAGNOSIS — Z23 ENCOUNTER FOR IMMUNIZATION: ICD-10-CM

## 2025-05-13 DIAGNOSIS — Z86.018 HISTORY OF ACOUSTIC NEUROMA: ICD-10-CM

## 2025-05-13 DIAGNOSIS — R09.02 HYPOXIA: ICD-10-CM

## 2025-05-13 DIAGNOSIS — Z13.1 DIABETES MELLITUS SCREENING: ICD-10-CM

## 2025-05-13 DIAGNOSIS — J98.6 CHRONICALLY ELEVATED HEMIDIAPHRAGM: Primary | ICD-10-CM

## 2025-05-13 DIAGNOSIS — H90.5 SENSORINEURAL HEARING LOSS (SNHL) OF LEFT EAR, UNSPECIFIED HEARING STATUS ON CONTRALATERAL SIDE: ICD-10-CM

## 2025-05-13 DIAGNOSIS — Z00.00 HEALTH MAINTENANCE EXAMINATION: Primary | ICD-10-CM

## 2025-05-13 DIAGNOSIS — R51.9 CHRONIC INTRACTABLE HEADACHE, UNSPECIFIED HEADACHE TYPE: Primary | ICD-10-CM

## 2025-05-13 DIAGNOSIS — R79.89 LOW VITAMIN B12 LEVEL: ICD-10-CM

## 2025-05-13 DIAGNOSIS — I10 PRIMARY HYPERTENSION: ICD-10-CM

## 2025-05-13 DIAGNOSIS — G89.29 CHRONIC INTRACTABLE HEADACHE, UNSPECIFIED HEADACHE TYPE: Primary | ICD-10-CM

## 2025-05-13 DIAGNOSIS — J98.6 CHRONICALLY ELEVATED HEMIDIAPHRAGM: ICD-10-CM

## 2025-05-13 DIAGNOSIS — E78.2 MIXED HYPERLIPIDEMIA: ICD-10-CM

## 2025-05-13 PROCEDURE — 99214 OFFICE O/P EST MOD 30 MIN: CPT | Performed by: INTERNAL MEDICINE

## 2025-05-13 PROCEDURE — 3078F DIAST BP <80 MM HG: CPT | Performed by: INTERNAL MEDICINE

## 2025-05-13 PROCEDURE — 1036F TOBACCO NON-USER: CPT | Performed by: NURSE PRACTITIONER

## 2025-05-13 PROCEDURE — 1036F TOBACCO NON-USER: CPT | Performed by: INTERNAL MEDICINE

## 2025-05-13 PROCEDURE — 90677 PCV20 VACCINE IM: CPT | Performed by: INTERNAL MEDICINE

## 2025-05-13 PROCEDURE — 3079F DIAST BP 80-89 MM HG: CPT | Performed by: NURSE PRACTITIONER

## 2025-05-13 PROCEDURE — 99215 OFFICE O/P EST HI 40 MIN: CPT | Performed by: STUDENT IN AN ORGANIZED HEALTH CARE EDUCATION/TRAINING PROGRAM

## 2025-05-13 PROCEDURE — 99396 PREV VISIT EST AGE 40-64: CPT | Performed by: INTERNAL MEDICINE

## 2025-05-13 PROCEDURE — 3008F BODY MASS INDEX DOCD: CPT | Performed by: NURSE PRACTITIONER

## 2025-05-13 PROCEDURE — 3075F SYST BP GE 130 - 139MM HG: CPT | Performed by: NURSE PRACTITIONER

## 2025-05-13 PROCEDURE — 99214 OFFICE O/P EST MOD 30 MIN: CPT | Performed by: NURSE PRACTITIONER

## 2025-05-13 PROCEDURE — 1036F TOBACCO NON-USER: CPT | Performed by: STUDENT IN AN ORGANIZED HEALTH CARE EDUCATION/TRAINING PROGRAM

## 2025-05-13 PROCEDURE — G2211 COMPLEX E/M VISIT ADD ON: HCPCS | Performed by: STUDENT IN AN ORGANIZED HEALTH CARE EDUCATION/TRAINING PROGRAM

## 2025-05-13 PROCEDURE — 3074F SYST BP LT 130 MM HG: CPT | Performed by: INTERNAL MEDICINE

## 2025-05-13 PROCEDURE — 3008F BODY MASS INDEX DOCD: CPT | Performed by: INTERNAL MEDICINE

## 2025-05-13 PROCEDURE — G0009 ADMIN PNEUMOCOCCAL VACCINE: HCPCS | Performed by: INTERNAL MEDICINE

## 2025-05-13 RX ORDER — VALSARTAN 160 MG/1
160 TABLET ORAL DAILY
Qty: 90 TABLET | Refills: 1 | Status: SHIPPED | OUTPATIENT
Start: 2025-05-13 | End: 2025-11-09

## 2025-05-13 RX ORDER — ROSUVASTATIN CALCIUM 5 MG/1
5 TABLET, COATED ORAL DAILY
Qty: 90 TABLET | Refills: 1 | Status: SHIPPED | OUTPATIENT
Start: 2025-05-13 | End: 2025-11-09

## 2025-05-13 RX ORDER — VIT C/E/ZN/COPPR/LUTEIN/ZEAXAN 250MG-90MG
500 CAPSULE ORAL DAILY
Qty: 90 TABLET | Refills: 1 | Status: SHIPPED | OUTPATIENT
Start: 2025-05-13 | End: 2025-11-09

## 2025-05-13 ASSESSMENT — ENCOUNTER SYMPTOMS
FEVER: 0
FATIGUE: 1
BLOOD IN STOOL: 0
LIGHT-HEADEDNESS: 0
ABDOMINAL PAIN: 0
SORE THROAT: 0
CONFUSION: 0
PALPITATIONS: 0
DIARRHEA: 0
CONSTIPATION: 0
CHILLS: 0
SHORTNESS OF BREATH: 1
COUGH: 0
DEPRESSION: 1
OCCASIONAL FEELINGS OF UNSTEADINESS: 1
LOSS OF SENSATION IN FEET: 1
NAUSEA: 0
DIZZINESS: 1
DYSURIA: 0
VOMITING: 0
AGITATION: 0

## 2025-05-13 ASSESSMENT — PATIENT HEALTH QUESTIONNAIRE - PHQ9
2. FEELING DOWN, DEPRESSED OR HOPELESS: SEVERAL DAYS
2. FEELING DOWN, DEPRESSED OR HOPELESS: NOT AT ALL
1. LITTLE INTEREST OR PLEASURE IN DOING THINGS: NOT AT ALL
SUM OF ALL RESPONSES TO PHQ9 QUESTIONS 1 AND 2: 0
SUM OF ALL RESPONSES TO PHQ9 QUESTIONS 1 AND 2: 1
1. LITTLE INTEREST OR PLEASURE IN DOING THINGS: NOT AT ALL

## 2025-05-13 NOTE — PROGRESS NOTES
"Virtual or Telephone Consent    An interactive audio and video telecommunication system which permits real time communications between the patient (at the originating site) and provider (at the distant site) was utilized to provide this telehealth service.   Verbal consent was requested and obtained from Andrey Drake on this date, 05/13/25 for a telehealth visit and the patient's location was confirmed at the time of the visit.    Subjective   Andrey Drake \"Miguel" is a 60 y.o. left-handed male who is being followed for hemicrania continua, NDPH, and chronic migraine without aura.     Since last seen, patient states that he did not have any improvement in headaches with indomethacin. Denies any side effects. Some nausea with indomethacin. Of all the medications he has trialed, the acetazolamide has been the most effective. Recalls being on amitriptyline which cause weight gain in the past.     Had pulmonary testing done, now needing O2 with exertion. Following with pulmonology for restrictions in lung expansion. Was found to be hypertensive and had and increase in BP medications. Most recent BP was 122/82 today per patient report. Notes that he will get headaches when he exerts himself. Has difficulty sleeping, and has sleep study coming up later this month.    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 Follows with ophthalmology annually, with normal exam last year per patient. Due for follow up.     Current Outpatient Medications   Medication Instructions    acetaminophen (TylenoL) 325 mg capsule Take by mouth.    acetaZOLAMIDE (DIAMOX) 125 mg, oral, 2 times daily    cyanocobalamin (VITAMIN B-12) 500 mcg, oral, Daily    ipratropium (Atrovent) 21 mcg (0.03 %) nasal spray SPRAY 2 SPRAYS INTO EACH NOSTRIL 3 TIMES A DAY AS NEEDED    rosuvastatin (CRESTOR) 5 mg, oral, Daily    sertraline (ZOLOFT) 200 mg, oral, Daily    SUMAtriptan (IMITREX) 50 mg, oral, Once as needed, May repeat dose once in 2 " hours if no relief.  Do not exceed 2 doses in 24 hours.    tetrahydrozoline 0.05 % ophthalmic solution Administer into affected eye(s).    traZODone (Desyrel) 50 mg tablet 1-2 tablets daily at bedtime as needed for sleep    valsartan (DIOVAN) 160 mg, oral, Daily    verapamil (CALAN) 160 mg, oral, 2 times daily    white petrolatum-mineral oiL 94-3 % ophthalmic ointment Apply to affected eye(s).     Assessment/Plan   Patient with chronic migraine vs NDPH vs hemicrania continua vs secondary headache. Indomethacin trial not effective, ruling out hemicrania continua. Sumatriptan questionably effective, and elevated calcium score, so will discontinue triptans for now. Of all medications trialed, acetazolamide seems to have provided the most relief raising suspicion for increased intracranial pressure. That being said, no optic nerve edema at our last appointment, and clinically does not seem to fit classic IIH. Murky clinical picture given comorbidities. Per our discussion, will hold off on starting any new medications for now while sleep study and ophthalmology appointment are upcoming. Pending results of these visits and initiation of any potential treatments, would consider increasing dose of acetazolamide at our follow up.       - discontinue sumatriptan (elevated Cardiac Ca score)  - discontinue indomethacin  - agree with sleep study  - follow up with ophthalmology (rule out new optic nerve edema)  - follow up in 4-5 months    I personally spent 42 minutes today, exclusive of procedures, providing care for this patient, including preparation, face to face time, documentation and other services such as review of medical records, diagnostic result, patient education, counseling, coordination of care as specified in the encounter.

## 2025-05-13 NOTE — PROGRESS NOTES
"Subjective   Tang Drake is a 60 y.o. male and is here for a comprehensive physical exam. The patient reports problems - dyspnea on exertion requiring oxygen, fatigue, follow-up on blood pressure.    Do you take any herbs or supplements that were not prescribed by a doctor? no  Are you taking calcium supplements? no  Are you taking aspirin daily? no     History of Present Illness  Andrey Drake \"Miguel" is a 60 year old male who presents for an annual wellness visit.    He has a history of hypertension, which was significantly elevated at 190 mmHg during a recent pulmonary test.  Patient had called the office and we adjusted his valsartan medication.  His blood pressure has since improved to 120 mmHg after his medication was adjusted. He is currently taking valsartan 160 mg daily, which was increased from 80 mg.  No adverse effects from the medication.  Denies any chest pain.     He experiences fatigue and shortness of breath with activity, leading to the use of supplemental oxygen in the evenings. His oxygen saturation dropped to 84% during a walking test, causing him to feel winded and tired. He uses a portable oxygen device when needed.  Patient is following with pulmonology for management of this.  He was found to have chronic right elevated diaphragm on sniff test by pulmonology.    He has a history of headaches and was previously prescribed indomethacin, which he discontinued due to side effects and lack of efficacy. He is currently taking verapamil.     He has a history of acoustic neuroma and underwent surgery, resulting in tinnitus and hearing issues, particularly in his left ear. He experiences high-pitched tinnitus that is persistent and disruptive. He has tried hearing aids without success due to single-sided deafness.  Patient has upcoming appointment with ENT.    He reports peripheral neuropathy symptoms, including tingling and pain in his fingers and toes, which have persisted since his surgery. He " describes the sensation as 'like a needle sticking in.'  He also has borderline low B12.  He is also on acetazolamide which is known to cause tingling sensation.  Advised to discuss this with his neurologist as well.    He has a history of meningitis and sepsis. He also mentions a history of colon polyps and is due for a follow-up colonoscopy in 2029.    He is currently taking rosuvastatin for cholesterol management. He has a history of low B12.    Patient is up-to-date on age and gender recommended screening.  Patient up-to-date on age and gender recommended vaccinations exception pneumonia vaccine which will be given today.    Do you have pain that bothers you in your daily life? yes  Review of Systems   Constitutional:  Positive for fatigue. Negative for chills and fever.   HENT:  Negative for sore throat.    Eyes:  Negative for visual disturbance.   Respiratory:  Positive for shortness of breath. Negative for cough.    Cardiovascular:  Negative for chest pain, palpitations and leg swelling.   Gastrointestinal:  Negative for abdominal pain, blood in stool, constipation, diarrhea, nausea and vomiting.   Genitourinary:  Negative for dysuria.   Skin:  Negative for rash.   Neurological:  Positive for dizziness. Negative for syncope and light-headedness.   Psychiatric/Behavioral:  Negative for agitation and confusion.         Objective   Physical Exam  Vitals and nursing note reviewed.   Constitutional:       General: He is not in acute distress.     Appearance: Normal appearance. He is obese. He is not ill-appearing, toxic-appearing or diaphoretic.   HENT:      Head: Normocephalic and atraumatic.      Mouth/Throat:      Mouth: Mucous membranes are moist.      Pharynx: Oropharynx is clear. No oropharyngeal exudate or posterior oropharyngeal erythema.   Eyes:      Pupils: Pupils are equal, round, and reactive to light.   Cardiovascular:      Rate and Rhythm: Normal rate and regular rhythm.      Heart sounds: Normal  heart sounds. No murmur heard.  Pulmonary:      Effort: Pulmonary effort is normal. No respiratory distress.      Breath sounds: Normal breath sounds. No wheezing or rhonchi.   Abdominal:      General: There is no distension.      Palpations: Abdomen is soft. There is no mass.      Tenderness: There is no abdominal tenderness. There is no guarding.   Musculoskeletal:      Cervical back: Neck supple.      Right lower leg: No edema.      Left lower leg: No edema.   Lymphadenopathy:      Cervical: No cervical adenopathy.   Skin:     General: Skin is warm and dry.      Coloration: Skin is not jaundiced or pale.      Findings: No rash.   Neurological:      Mental Status: He is alert and oriented to person, place, and time. Mental status is at baseline.   Psychiatric:         Mood and Affect: Mood normal.         Behavior: Behavior normal.         Thought Content: Thought content normal.         Judgment: Judgment normal.       Physical Exam    Assessment/Plan   Assessment & Plan  Oxygen therapy for respiratory insufficiency  Respiratory insufficiency requiring oxygen therapy, particularly with activity. Oxygen saturation drops significantly during exertion, indicating a need for supplemental oxygen. Ongoing evaluation for underlying causes, including diaphragm dysfunction and potential sleep apnea. A sleep study is planned to assess for sleep apnea, which could contribute to fatigue and respiratory issues.  - Continue oxygen therapy as prescribed.  - Undergo sleep study to evaluate for sleep apnea.  - Consult with a specialist for further evaluation of diaphragm dysfunction.    Hypertension  Hypertension is well-controlled with valsartan. Previous high readings were noted, but recent adjustments to medication have improved blood pressure levels significantly. Current readings are improved compared to previous visits. He is advised to monitor blood pressure regularly and adjust medication if readings become too low.  -  Continue valsartan 160 mg daily.  - Monitor blood pressure regularly.  - Send new prescription for valsartan 160 mg to pharmacy.  - Follow-up in 3 months for recheck    Hearing loss and tinnitus  Persistent hearing loss and tinnitus, particularly in the left ear, following previous brain tumor surgery for acoustic neuroma. Symptoms include high-pitched tinnitus and difficulty hearing in noisy environments. Previous attempts with hearing aids were unsuccessful due to single-sided deafness. He plans to consult with an ENT specialist to explore further options, including cochlear implants or other hearing assistance devices.  - Consult with ENT specialist to evaluate hearing loss and tinnitus.  - Discuss potential options for cochlear implant or other hearing assistance devices.    Peripheral neuropathy  Peripheral neuropathy with symptoms of tingling and pain in fingers and toes, possibly related to previous sepsis or surgery. Symptoms are persistent and affect daily activities. He is advised to discuss these symptoms with a neurologist for further evaluation and management.  - Discuss symptoms with neurologist for further evaluation and management.  - The symptoms may also be related to his acetazolamide which is known to cause tingling.  - Patient has borderline low B12 which will be supplemented    History of acoustic neuroma  Tree of acoustic neuroma with ongoing monitoring. Previous surgery has resulted in some residual symptoms, including hearing loss and peripheral neuropathy. Annual MRI is planned to monitor the status of the acoustic neuroma  - Continue annual MRI for monitoring of acoustic neuroma status.    Hyperlipidemia: Will continue rosuvastatin at this time with refills provided today.  Will recheck a lipid panel    Diabetes mellitus screening: Check hemoglobin A1c    General Health Maintenance  Routine health maintenance is up to date. Recent blood work showed normal PSA levels and low end of normal  B12 levels. He is advised to start a B12 supplement to support nerve and cognitive function. A pneumonia vaccine is recommended due to lung status and recent changes in guidelines.  - Start B12 supplement at 500 mcg daily.  - Administer pneumonia vaccine.  - Plan for routine labs in late summer to monitor cholesterol and other health markers.  -Patient up-to-date on age and gender recommended screening  Healthy male exam.      1.  Patient up-to-date on age and gender recommended screening and immunizations exception of pneumonia vaccine which will be given today  2. Patient Counseling:  --Nutrition: Stressed importance of moderation in sodium/caffeine intake, saturated fat and cholesterol, caloric balance, sufficient intake of fresh fruits, vegetables, fiber, calcium, iron, and 1 mg of folate supplement per day (for females capable of pregnancy).  --Discussed the issue of estrogen replacement, calcium supplement, and the daily use of baby aspirin.  --Exercise: Stressed the importance of regular exercise.   --Substance Abuse: Discussed cessation/primary prevention of tobacco, alcohol, or other drug use; driving or other dangerous activities under the influence; availability of treatment for abuse.    --Sexuality: Discussed sexually transmitted diseases, partner selection, use of condoms, avoidance of unintended pregnancy  and contraceptive alternatives.   --Injury prevention: Discussed safety belts, safety helmets, smoke detector, smoking near bedding or upholstery.   --Dental health: Discussed importance of regular tooth brushing, flossing, and dental visits.  --Immunizations reviewed.  --Discussed benefits of screening colonoscopy.  --After hours service discussed with patient  3. Discussed the patient's BMI with him.  The BMI is above average. The patient received Provided instructions on dietary changes  Provided instructions on exercise  Advised to Increase physical activity because they have an above normal  BMI.  4. Follow up in 3 months    Zhang Lara DO     This medical note was created with the assistance of artificial intelligence (AI) for documentation purposes. The content has been reviewed and confirmed by the healthcare provider for accuracy and completeness. Patient consented to the use of audio recording and use of AI during their visit.

## 2025-05-13 NOTE — PATIENT INSTRUCTIONS
Chronic right sided elevated diaphragm seen on ct cardiac scoring   - Mip decreased, Mep wnl   -  sniff test - Mildly elevated right hemidiaphragm. Otherwise the hemidiaphragms  move symmetrically without evidence of paralysis  - refer to surgery - Dr. Ken, contacted Mirella Ford for an appt     Dyspnea/wheezing shortness of breath  - pulmonary function test -normal spirometry lung volumes indicate restrictive ventilatory, Impairment diffusion capacity is normal  FENO 15.75  6 minute walk test  - desaturation 86% rra ambulating, on 2 l ambulating 94%      Snoring, daytime excessive sleeping   - in lab sleep test pending       Thank you for visiting the Pulmonary clinic today!       Return to clinic after 12 weeks  or sooner if needed   Melia Knox CNP  My office number is [phone] -     Call to schedule  for radiology - CT scans/PFTs etc at  [phone]  General scheduling  [phone]     Best way to get a hold of me is to call my office --> Please do not send me follow my health messages  Any test results will be discussed at next visit -- please make sure to make a follow up appt after testing.

## 2025-05-21 ENCOUNTER — CLINICAL SUPPORT (OUTPATIENT)
Dept: SLEEP MEDICINE | Facility: CLINIC | Age: 60
End: 2025-05-21
Payer: MEDICARE

## 2025-05-21 VITALS — BODY MASS INDEX: 33.39 KG/M2 | WEIGHT: 260.14 LBS | HEIGHT: 74 IN

## 2025-05-21 DIAGNOSIS — R06.83 SNORING: ICD-10-CM

## 2025-05-21 DIAGNOSIS — G47.33 OBSTRUCTIVE SLEEP APNEA (ADULT) (PEDIATRIC): ICD-10-CM

## 2025-05-21 DIAGNOSIS — G47.19 EXCESSIVE DAYTIME SLEEPINESS: ICD-10-CM

## 2025-05-21 PROCEDURE — 95810 POLYSOM 6/> YRS 4/> PARAM: CPT | Performed by: PSYCHIATRY & NEUROLOGY

## 2025-05-22 ASSESSMENT — SLEEP AND FATIGUE QUESTIONNAIRES
HOW LIKELY ARE YOU TO NOD OFF OR FALL ASLEEP IN A CAR, WHILE STOPPED FOR A FEW MINUTES IN TRAFFIC: WOULD NEVER DOZE
HOW LIKELY ARE YOU TO NOD OFF OR FALL ASLEEP WHILE LYING DOWN TO REST IN THE AFTERNOON WHEN CIRCUMSTANCES PERMIT: HIGH CHANCE OF DOZING
HOW LIKELY ARE YOU TO NOD OFF OR FALL ASLEEP WHILE SITTING AND TALKING TO SOMEONE: WOULD NEVER DOZE
HOW LIKELY ARE YOU TO NOD OFF OR FALL ASLEEP WHEN YOU ARE A PASSENGER IN A CAR FOR AN HOUR WITHOUT A BREAK: MODERATE CHANCE OF DOZING
ESS-CHAD TOTAL SCORE: 15
HOW LIKELY ARE YOU TO NOD OFF OR FALL ASLEEP WHILE SITTING AND READING: HIGH CHANCE OF DOZING
HOW LIKELY ARE YOU TO NOD OFF OR FALL ASLEEP WHILE WATCHING TV: HIGH CHANCE OF DOZING
SITING INACTIVE IN A PUBLIC PLACE LIKE A CLASS ROOM OR A MOVIE THEATER: MODERATE CHANCE OF DOZING
HOW LIKELY ARE YOU TO NOD OFF OR FALL ASLEEP WHILE SITTING QUIETLY AFTER LUNCH WITHOUT ALCOHOL: MODERATE CHANCE OF DOZING

## 2025-05-22 NOTE — PROGRESS NOTES
[facility] TECH NOTE:     Patient: Andrey Drake   MRN//AGE: [id number]  1965  60 y.o.   Technologist: Saul Hdz   Room: 1   Service Date: 2025        Sleep Testing Location: [facility] [place]: 15    TECHNOLOGIST SLEEP STUDY PROCEDURE NOTE:   This sleep study is being conducted according to the policies and procedures outlined by the AAS accreditation standards.  The sleep study procedure and processes involved during this appointment was explained to the patient/patient’s family, questions were answered. The patient/family verbalized understanding.      The patient is a 60 y.o. year old male scheduled for a Diagnostic PSG Split night with montage of: Standard PSG. He arrived for his appointment.      The study that was ultimately completed was a Diagnostic PSG with montage of: Standard PSG.    The full study Was completed.  Patient questionnaires completed?: Yes    Consents signed? Yes    Initial Fall Risk Screening:     Andrey has fallen in the last 6 months. Andrey does have a fear of falling. He does not need assistance with sitting, standing, or walking. He does not need assistance walking in his home. He does not need assistance in an unfamiliar setting. The patient is using an assistive device.(Cane)     Brief Study observations: Patient took Trazodone prior to study. Supine sleep encouraged. Patient educated on CPAP prior to study. Patient did not meet split criteria due to lack of respiratory events by 2AM.     Deviation to order/protocol and reason: Study remained as a diagnostic PSG      If PAP, which was preferred mask/pressure/mode: N/A      Other:None    After the procedure, the patient/family was informed to ensure follow up with ordering clinician for testing results.      Technologist: Salu Hdz

## 2025-05-27 ENCOUNTER — APPOINTMENT (OUTPATIENT)
Dept: SURGERY | Facility: CLINIC | Age: 60
End: 2025-05-27
Payer: MEDICARE

## 2025-05-27 VITALS
BODY MASS INDEX: 33.38 KG/M2 | WEIGHT: 260 LBS | HEART RATE: 76 BPM | RESPIRATION RATE: 18 BRPM | DIASTOLIC BLOOD PRESSURE: 88 MMHG | SYSTOLIC BLOOD PRESSURE: 131 MMHG

## 2025-05-27 DIAGNOSIS — G47.33 OSA (OBSTRUCTIVE SLEEP APNEA): Primary | ICD-10-CM

## 2025-05-27 DIAGNOSIS — R06.09 DYSPNEA ON EXERTION: Primary | ICD-10-CM

## 2025-05-27 PROCEDURE — 3079F DIAST BP 80-89 MM HG: CPT | Performed by: NURSE PRACTITIONER

## 2025-05-27 PROCEDURE — 99214 OFFICE O/P EST MOD 30 MIN: CPT | Performed by: NURSE PRACTITIONER

## 2025-05-27 PROCEDURE — 3075F SYST BP GE 130 - 139MM HG: CPT | Performed by: NURSE PRACTITIONER

## 2025-05-27 NOTE — PROGRESS NOTES
Subjective   Patient ID: Tang Drake is a 60 y.o. male.    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 He was in ICU for 9 days  and (was intubated)and hospital days were 29- he continues with Ampho B at home.   Since the tumor, when he walks, or does anything with any physical exertion he gets really tires. He maps frequently. He has balance issues. Along iwht balance, he has eye issues, tear duct issues. Since surgery, he is trouble moving around.   He gets winded just walking from 2100 to pacing room.   He just began oxygen 2 months - he was started on oxygen after 6 min walk test.     He has increased work of breathing when flat on back, he lays on left, He reports wheezing when bending over.     He just has sleep study done 5/22/25.   He was referred to us by pulm sleep of mildy elevated right HD.       Review of Systems    Objective   Physical Exam  Slight imbalance, walks with cane,   No cognitive deficits noted.   No increased work of breathing at rest.     Assessment/Plan   There are no diagnoses linked to this encounter.  60 year old male who came to today's appointment accompanied by his wife. He did have SNIFF test done that showed mildly elevated right HD. The movement of the right is symmetric to left. The right moves nearly as much as left. The degree to which the right HD is elevated or slight decrease of right side movement compared to left should not be a factor in his shortness of breath.   I did think there was a chance he was having central apnea given the acoustic neuroma and post op course but the sleep study showed only obstructive events.   At this time, there is no role for pacing. I strongly recommend he use CPAP. If he is still having SOB issues after being on CPAP he can call us for another evaluation.   I also suggested he try doing some respiratory muscle exercising.

## 2025-06-19 ENCOUNTER — TELEPHONE (OUTPATIENT)
Dept: OTHER | Age: 60
End: 2025-06-19
Payer: MEDICARE

## 2025-06-24 ENCOUNTER — APPOINTMENT (OUTPATIENT)
Dept: BEHAVIORAL HEALTH | Facility: CLINIC | Age: 60
End: 2025-06-24
Payer: MEDICARE

## 2025-06-24 DIAGNOSIS — G31.84 MILD COGNITIVE IMPAIRMENT: ICD-10-CM

## 2025-06-24 DIAGNOSIS — F33.1 MODERATE EPISODE OF RECURRENT MAJOR DEPRESSIVE DISORDER: ICD-10-CM

## 2025-06-24 DIAGNOSIS — G47.00 INSOMNIA, UNSPECIFIED TYPE: ICD-10-CM

## 2025-06-24 DIAGNOSIS — F41.1 GAD (GENERALIZED ANXIETY DISORDER): ICD-10-CM

## 2025-06-24 PROCEDURE — 99214 OFFICE O/P EST MOD 30 MIN: CPT | Performed by: CLINICAL NURSE SPECIALIST

## 2025-06-24 RX ORDER — ESCITALOPRAM OXALATE 10 MG/1
10 TABLET ORAL DAILY
Qty: 30 TABLET | Refills: 1 | Status: SHIPPED | OUTPATIENT
Start: 2025-06-24 | End: 2026-06-24

## 2025-06-24 NOTE — PROGRESS NOTES
"Outpatient Psychiatry      Subjective   Andrey Drake \"Tang\", a 60 y.o. male, presents as an established patient for an outpatient appointment /medication management in psychiatry  for an audio and video virtual  appointment , he was at home for the appointment      Virtual or Telephone Consent     An interactive audio and video telecommunication system which permits real time communications between the patient (at the originating site) and provider (at the distant site) was utilized to provide this telehealth service.   Verbal consent was requested and obtained from Andrey Drake on this date, 6/24/25  for a telehealth visit.       Diagnosis: ·      RONDA (generalized anxiety disorder) (300.02) (F41.1) moderate    · Insomnia unspecified type mild G47.00   · Depression, major recurrent ,moderate ( primary diagnoses) F 33.1  Mild cognitive impairment G 31. 84 per neuropsychological evaluation may 2024      Treatment Plan/Recommendations:   Follow-up plan  was discussed with patient.  can follow up in 4-6 weeks , can continue self care and wellness efforts and maintain routine health screenings can call  for treatment and scheduling concerns  Psychotropic medications :  Continue and decrease sertraline to 100 mg daily for one week and stop , can start escitalopram 10 mg , daily and stay at that dose for depression and anxiety   Continue trazodone 50 mg , 2 tablets daily each night as needed at bedtime for sleep / insomnia       HPI:  reviewed the notes in the [facility] emr from other medical providers    Mood is depressed with depressed and congruent affect   Has low motivation and low energy   Isolates to himself   He says his wife is concerned about his withdrawing from activities , he does not feel confident in his social interactions   Has not attended social events much of the time   ruminates on conversations and social events  Very self conscious over what others think of him at social events , " which increases anxiety   Everyday tasks are difficult   Has difficulty with speech and word finding and this is worse when he is anxious   Appears overwhelmed at times   At night gets ringing in his ears , he goes over conversations in his mind repeatedly in a ruminative manner   has not been sleeping well   there is no indication of issues with mood cycling   no psychoses   he actively participated in discussion    no panic attacks recently   no thoughts of harming himself or others   denies issues or history of substance abuse  No side effects of sertraline   no psychoses   Discussed switch to escitalopram , with provided psychoeducation , he agrees to the switch for the ssri , reviewed the cross titration    Gets headaches frequently , he will be seeing a new neurologist, today he has a headache and wants to do the appointment still , is having more difficulty with communicating    Mental status :   Appears fatigued  , tearful    thought blocking is apparent   Has word finding difficulties, expressive aphasia    No altered thoughts , no delusional thoughts , no hallucinations     Speech is slow at times , with some difficulty with word finding   No internal stimulation      Psych ros and medical ros as noted above          Social History     Socioeconomic History    Marital status:      Spouse name: Not on file    Number of children: Not on file    Years of education: Not on file    Highest education level: Not on file   Occupational History    Not on file   Tobacco Use    Smoking status: Never     Passive exposure: Never    Smokeless tobacco: Never   Vaping Use    Vaping status: Never Used   Substance and Sexual Activity    Alcohol use: Yes     Alcohol/week: 2.0 standard drinks of alcohol     Types: 2 Cans of beer per week     Comment: socially    Drug use: Never    Sexual activity: Not on file   Other Topics Concern    Not on file   Social History Narrative    Not on file     Social Drivers of Health      Financial Resource Strain: Not on file   Food Insecurity: Not on file   Transportation Needs: Not on file   Physical Activity: Not on file   Stress: Not on file   Social Connections: Not on file   Intimate Partner Violence: Not on file   Housing Stability: Not on file     Vitals:  There were no vitals filed for this visit.    Padmini Gerard, APRN-CNS

## 2025-06-26 ENCOUNTER — TELEPHONE (OUTPATIENT)
Dept: OTHER | Age: 60
End: 2025-06-26
Payer: MEDICARE

## 2025-06-30 NOTE — PROGRESS NOTES
"History Of Present Illness:  Andrey Drake \"Miguel" is a 60 y.o. male with a history of acoustic neuroma and asymmetric SNHL, he is s/p right translabyrinthine approach for lateral skull base tumor resection on 1/10/22 complicated by wound infection and meningitis, now s/p postauricular wound washout and debridement on 1/29/22. 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    Today he reports he continues to experience chronic migraine with photophobia. Also greatly struggles with visual movements which makes him nervous to drive. He has upcoming appointment with ophthalmology in 9/2025 before a trial of a new preventive medication. He currently sees Dr. Covington for management of his headaches.     Regarding his hearing, he reports a trial of hearing aids which unfortunately did not provide benefit for him in loud environments. Would like to discuss possible treatment options.     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 He started to work with psychiatry which has been very helpful. He did trial Topiramate, it caused him significant confusion, he's now on Ajovy with his second injection is coming up. His balance continues to improve, but he finds that quick head movements, loud noise, and bright lights make it worse. Since starting Ajovy, he's having dry heaving on a daily basis. He hasn't done vestibular therapy in the last 2 months. He continues to have high pitched right-sided tinnitus with intermittent low humming on the left side.     Surgical History:  He has a past surgical history that includes Other surgical history (08/19/2020) and Other surgical history (01/20/2022).     Allergies:  Other    Medications: "   Current Outpatient Medications   Medication Instructions    acetaminophen (TylenoL) 325 mg capsule Take by mouth.    acetaZOLAMIDE (DIAMOX) 125 mg, oral, 2 times daily    cyanocobalamin (VITAMIN B-12) 500 mcg, oral, Daily    escitalopram (LEXAPRO) 10 mg, oral, Daily    ipratropium (Atrovent) 21 mcg (0.03 %) nasal spray SPRAY 2 SPRAYS INTO EACH NOSTRIL 3 TIMES A DAY AS NEEDED    rosuvastatin (CRESTOR) 5 mg, oral, Daily    sertraline (ZOLOFT) 200 mg, oral, Daily    tetrahydrozoline 0.05 % ophthalmic solution Administer into affected eye(s).    traZODone (Desyrel) 50 mg tablet 1-2 tablets daily at bedtime as needed for sleep    valsartan (DIOVAN) 160 mg, oral, Daily    verapamil (CALAN) 160 mg, oral, 2 times daily    white petrolatum-mineral oiL 94-3 % ophthalmic ointment Apply to affected eye(s).      Review of Systems:   A comprehensive 10-point review of systems was obtained including constitutional, neurological, HEENT, pulmonary, cardiovascular, genito-urinary, and other pertinent systems and was negative except as noted in the HPI.      Physical Exam:  Constitutional   General appearance: Healthy-appearing, well-nourished, well groomed, in no acute distress.   Ability to communicate: Normal communication without aids, normal voice quality.       Head and face: Atraumatic with no masses, lesions, or scarring.   Facial strength: Normal strength and symmetry, no synkinesis or facial tic.     Ears  Otoscopic examination: Bilateral normal otoscopy of the tympanic membrane     Nose: Dorsum symmetric with no visible or palpable deformities.     Oral Cavity/Mouth  Lips, teeth, and gums: Normal lips, gums, and dentition.     Oropharynx: Mucosa moist, no lesions.     Neck: Symmetrical, trachea midline. No masses visible.     Neurological/Psychiatric  Cranial Nerve Examination: II - XII grossly intact.  Orientation to person, place, and time: Normal.  Mood and affect: Normal.     Skin: Normal without rashes or lesions.    "  Pulmonary  Respiratory effort: Chest expands symmetrically.     Cardiovascular: Good peripheral pulses  Peripheral vascular system: No varicosities, carotid pulse normal, no edema. No jugular venous distension.     Extremities: Appearance of extremities: Normal. Gait normal.     Last Recorded Vitals:  Height 1.88 m (6' 2\"), weight 120 kg (265 lb).    Relevant Results:  I personally reviewed the MRI IAC from 10/7/24 which demonstrated stable postsurgical changes of right translabyrinthine approach for resection of the right vestibular schwannoma. Stable size and appearance of the residual soft tissue density within the IAC.    I personally reviewed the audiogram from 7/1/25 which demonstrated right sided profound hearing loss with 0% word understanding. On the left, he has mild to moderate SNHL with 96% word understanding.          Assessment/Plan   60 y.o. male with a history of acoustic neuroma and asymmetric SNHL, he is s/p right translabyrinthine approach for lateral skull base tumor resection on 1/10/22 complicated by wound infection and meningitis, now s/p postauricular wound washout and debridement on 1/29/22. The patient reports he continues to experience chronic migraine with photophobia and will be seeing an ophthalmologist in 9/2025. From a hearing standpoint, he reports that he tried a BiCros hearing aid for 1 month which unfortunately did not provide benefit for him in loud environments. We discussed possible treatment options including a Cros hearing aid vs a BAHA. Discussed the benefits of each treatment and patient elects to proceed with a BAHA evaluation at this time.     - Order MRI IAC to be repeated in 10/2025  - Follow-up with audiology for BAHA evaluation  - Follow-up with ophthalmology as scheduled in 9/2025  fUP with me in 1 year    Scribe Attestation  By signing my name below, IChristie Scribe   attest that this documentation has been prepared under the direction and in the " presence of Margi Manrique MD.      I have reviewed the documentation as scribed by Jayden Rivera and agree with the notes.   Margi Manrique MD

## 2025-07-01 ENCOUNTER — APPOINTMENT (OUTPATIENT)
Dept: OTOLARYNGOLOGY | Facility: CLINIC | Age: 60
End: 2025-07-01
Payer: MEDICARE

## 2025-07-01 ENCOUNTER — CLINICAL SUPPORT (OUTPATIENT)
Dept: AUDIOLOGY | Facility: CLINIC | Age: 60
End: 2025-07-01
Payer: MEDICARE

## 2025-07-01 VITALS — HEIGHT: 74 IN | BODY MASS INDEX: 34.01 KG/M2 | WEIGHT: 265 LBS

## 2025-07-01 DIAGNOSIS — H90.3 BILATERAL SENSORINEURAL HEARING LOSS: Primary | ICD-10-CM

## 2025-07-01 DIAGNOSIS — H90.3 ASYMMETRIC SNHL (SENSORINEURAL HEARING LOSS): ICD-10-CM

## 2025-07-01 DIAGNOSIS — D33.3 ACOUSTIC NEUROMA (MULTI): Primary | ICD-10-CM

## 2025-07-01 DIAGNOSIS — G43.711 CHRONIC MIGRAINE WITHOUT AURA, INTRACTABLE, WITH STATUS MIGRAINOSUS: ICD-10-CM

## 2025-07-01 DIAGNOSIS — H93.13 TINNITUS OF BOTH EARS: ICD-10-CM

## 2025-07-01 PROCEDURE — 92550 TYMPANOMETRY & REFLEX THRESH: CPT | Mod: 52

## 2025-07-01 PROCEDURE — G2211 COMPLEX E/M VISIT ADD ON: HCPCS | Performed by: OTOLARYNGOLOGY

## 2025-07-01 PROCEDURE — 92557 COMPREHENSIVE HEARING TEST: CPT

## 2025-07-01 PROCEDURE — 99214 OFFICE O/P EST MOD 30 MIN: CPT | Performed by: OTOLARYNGOLOGY

## 2025-07-01 PROCEDURE — 1036F TOBACCO NON-USER: CPT | Performed by: OTOLARYNGOLOGY

## 2025-07-01 PROCEDURE — 3008F BODY MASS INDEX DOCD: CPT | Performed by: OTOLARYNGOLOGY

## 2025-07-01 ASSESSMENT — PATIENT HEALTH QUESTIONNAIRE - PHQ9
1. LITTLE INTEREST OR PLEASURE IN DOING THINGS: NEARLY EVERY DAY
SUM OF ALL RESPONSES TO PHQ9 QUESTIONS 1 AND 2: 6
2. FEELING DOWN, DEPRESSED OR HOPELESS: NEARLY EVERY DAY

## 2025-07-01 ASSESSMENT — PAIN - FUNCTIONAL ASSESSMENT: PAIN_FUNCTIONAL_ASSESSMENT: 0-10

## 2025-07-01 ASSESSMENT — PAIN SCALES - GENERAL: PAINLEVEL_OUTOF10: 0 - NO PAIN

## 2025-07-01 NOTE — PATIENT INSTRUCTIONS
SCHEDULE YOUR MRI FOR 10/2025. TO SCHEDULE YOUR CT OR MRI WITH A  FACILITY PLEASE CALL [phone].

## 2025-07-01 NOTE — PROGRESS NOTES
AUDIOLOGIC EVALUATION      Name:  Tang Drake  :  1965  Age:  60 y.o.  Date of Evaluation:  2025    Time: 9827-4417    HISTORY:  Andrey Drake, 60 y.o., was seen for an audiologic assessment in conjunction with ENT evaluation. Recall, he has a history of a right acoustic neuroma. 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 He noted that softer sounds are getting harder to hear. He has constant right tinnitus and intermittent left tinnitus that has become more severe over the last year. He has right otalgia in conjunction with headaches. He has dizziness that has persisted. He uses a cane for ambulation. He noted it is often more severe in open spaces. He no longer attends vestibular therapy. He noted approximately 20 falls in the last year, resulting in injury (sprained knee and ankle). He denied otorrhea and a history of noise exposure.       RESULTS:  Otoscopic Evaluation:  Right Ear: Unremarkable  Left Ear: Unremarkable    Immittance Measures:  Right Ear: Type A -- indicating normal volume, pressure, and static compliance  Left Ear: Type A -- indicating normal volume, pressure, and static compliance    Acoustic Reflexes:  Right Ear: Did not test.  Left Ear: (Ipsilateral) Responses present at expected levels for 500-4000 Hz.    Pure Tone Audiometry:    Right Ear: Profound sensorineural hearing loss 125-8000 Hz  Left Ear: Hearing within normal limits 125-1000 Hz sloping to a moderately-severe sensorineural hearing loss at 8000 Hz    Asymmetry: Yes, right ear poorer 125-8000 Hz    In comparison to previous audio completed on 23, his hearing has remained stable in the left ear.      Reliability:   Good    Speech Audiometry:   Right: Speech Reception Threshold (SRT) was attempted but could not be obtained due to severity of hearing  loss.    Left: Speech Reception Threshold (SRT) was obtained at 25 dBHL.   SRT/PTA in Good agreement with pure tone average.    Word Recognition Scores (WRS):  Right Ear: Could not be tested due to severity of hearing loss and inability to obtain an SRT.  Left Ear: excellent (96%) in quiet when words were presented at 65 dBHL.       IMPRESSIONS:  Today's testing revealed normal ear canal volume, middle ear pressure, and tympanic membrane compliance in both ears. He has a profound sensorineural hearing loss in the right ear and hearing within normal limits sloping to a moderately-severe sensorineural hearing loss in the left ear with excellent word recognition. The results were discussed with the patient. He should follow-up with ENT as scheduled.       RECOMMENDATIONS:  1.) Continue medical follow up with Ears Nose and Throat (ENT) provider as recommended.  2.) Return for audiologic evaluation in conjunction with medical management or annually (whichever is sooner) to monitor hearing sensitivity and assess middle ear status or sooner should concerns arise. The audiology department can be reached at [phone] to schedule an appointment.         Kandis Baker, CCC-A  Clinical Audiologist      KEY  SNHL Sensorineural Hearing Loss   CHL Conductive Hearing Loss   MHL Mixed Hearing Loss   SSNHL Sudden Sensorineural Hearing Loss   WNL Within Normal Limits   PTA Pure Tone Average   TM Tympanic Membrane   ECV Ear Canal Volume   SRT Speech Reception Threshold   WRS Word Recognition Score

## 2025-07-08 ENCOUNTER — CLINICAL SUPPORT (OUTPATIENT)
Dept: AUDIOLOGY | Facility: CLINIC | Age: 60
End: 2025-07-08
Payer: MEDICARE

## 2025-07-08 DIAGNOSIS — H90.3 BILATERAL SENSORINEURAL HEARING LOSS: Primary | ICD-10-CM

## 2025-07-08 PROCEDURE — 92626 EVAL AUD FUNCJ 1ST HOUR: CPT

## 2025-07-08 PROCEDURE — 92627 EVAL AUD FUNCJ EA ADDL 15: CPT

## 2025-07-08 ASSESSMENT — PAIN SCALES - GENERAL: PAINLEVEL_OUTOF10: 0 - NO PAIN

## 2025-07-08 ASSESSMENT — PAIN - FUNCTIONAL ASSESSMENT: PAIN_FUNCTIONAL_ASSESSMENT: 0-10

## 2025-07-08 NOTE — LETTER
"2025     Margi Manrique MD  [address] [facility] [address]  [facility] [address]    Patient: Tang Drake   YOB: 1965   Date of Visit: 2025       Dear Dr. Margi Manrique MD:    Thank you for referring Tang Drake to me for evaluation. Below are my notes for this consultation.  If you have questions, please do not hesitate to call me. I look forward to following your patient along with you.       Sincerely,     Kandis Kelly      CC: CHINYERE Chen AUD, CCC-A  ______________________________________________________________________________________                                                                                AUDIOLOGY BONE ANCHORED IMPLANT (LILIAN) EVALUATION    Name:  Andrey Drake \"Miguel"  :  1965  Age:  60 y.o.  Date of Evaluation: 2025    HISTORY  Andrey Drake, age 60 years, arrives today for a LILIAN evaluation. He arrives with the concern of hearing better in noise as well as tinnitus management.    RECALL  Andrey Drake, 60 y.o., was seen for an audiologic assessment in conjunction with ENT evaluation. Recall, he has a history of a right acoustic neuroma. 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 He noted that softer sounds are getting harder to hear. He has constant right tinnitus and intermittent left tinnitus that has become more severe over the last year. He has right otalgia in conjunction with headaches. He has dizziness that has persisted. He uses a cane for ambulation. He noted it is often more severe in open spaces. He no longer attends vestibular therapy. He noted approximately 20 falls in the last year, resulting in injury (sprained knee and ankle). He denied otorrhea and a history of noise exposure.     AIDED TESTING   Functional gain testing was completed with " the BAHA 6 Max sound processor on a softband, traditional Phonak Jennifer P-UP BTE hearing aid, and Phonak Audeo L90 R CROS.     Testing completed at 45 degrees azimuth. Sound or word stimulus was presented via soundfield speaker on the left at 50 dB HL (60 dB A) while masker noise was presented via sound field speaker on the right for the CROS and BAHA and via insert headphone for the traditional hearing aid.     UNAIDED:   CNC words at 50 dB HL= 0%    AzBIO sentences at 50 dB HL = 0%     TRADITIONAL AIDED:  CNC words at 50 dB HL= 0%    AzBIO sentences at 50 dB HL = 0%   AzBIO sentences at 50 dB HL with 40 dB HL of noise = 0%     BAHA AIDED:   SRT = 40 dB HL  CNC words at 50 dB HL= 60%    AzBIO sentences at 50 dB HL = 98%   AzBIO sentences at 50 dB HL with 40 dB HL of noise = 97%   Aided thresholds were obtained from 35 dBHL - 65 dBHL for the frequencies of 250-6000 Hz.     BiCROS AIDED  SRT = 30  CNC words at 50 dB HL= 96%    AzBIO sentences at 50 dB HL = 100%   AzBIO sentences at 50 dB HL with 40 dB HL of noise = 100%   Aided thresholds were obtained from 35 dBHL - 45 dBHL for the frequencies of 250-6000 Hz.     IMPRESSION   Functional testing revealed excellent word and sentences recognition when noise is introduced to the better hearing ear utilizing a CROS and BAHA on softband. Tang is interested in a BiCROS at this time but is also curious about bilateral traditional amplification as a right tinnitus masker. He left with the Phonak Audeo I-R BiCROS system to trial for a week. He will return in one week to return the CROS and trial Audeo I-R devices with the right as a tinnitus masker. He was made aware of the out of pocket trial cost.     We discussed tinnitus management in detail. After discussion he is concerned about bone conduction lack of potential assistance with tinnitus as well as concerns for placement on his head due to all day daily wear of a baseball hat to help with eye sensitivity. He will first  try a BiCROS and then traditional hearing aids. 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 We briefly discussed tinnitus management with [facility] and Leniere treatment; however, I am unsure of appropriate referral guidelines in cases such as this.    TREATMENT PLAN:  1. Continue medical follow-up with our otologist/ENT team .  2. Follow up in one week to return BiCROS and trial traditional hearing aids  3.  Annual hearing assessments.  4. Contact clinic with questions or concerns at [phone].    Time Stamp: 90 minutes     Completed by:  Kandis Johnson, [facility]  Licensed Audiologist

## 2025-07-08 NOTE — PROGRESS NOTES
"                                                                            AUDIOLOGY BONE ANCHORED IMPLANT (LILIAN) EVALUATION    Name:  Andrey Drake \"Tang\"  :  1965  Age:  60 y.o.  Date of Evaluation: 2025    HISTORY  Andrey Drake, age 60 years, arrives today for a LILIAN evaluation. He arrives with the concern of hearing better in noise as well as tinnitus management.    RECALL  Andrey Drake, 60 y.o., was seen for an audiologic assessment in conjunction with ENT evaluation. Recall, he has a history of a right acoustic neuroma. 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 He noted that softer sounds are getting harder to hear. He has constant right tinnitus and intermittent left tinnitus that has become more severe over the last year. He has right otalgia in conjunction with headaches. He has dizziness that has persisted. He uses a cane for ambulation. He noted it is often more severe in open spaces. He no longer attends vestibular therapy. He noted approximately 20 falls in the last year, resulting in injury (sprained knee and ankle). He denied otorrhea and a history of noise exposure.     AIDED TESTING   Functional gain testing was completed with the BAHA 6 Max sound processor on a softband, traditional Phonak Jennifer P-UP BTE hearing aid, and Phonak TORCH.sheo L90 R CROS.     Testing completed at 45 degrees azimuth. Sound or word stimulus was presented via soundfield speaker on the left at 50 dB HL (60 dB A) while masker noise was presented via sound field speaker on the right for the CROS and BAHA and via insert headphone for the traditional hearing aid.     UNAIDED:   CNC words at 50 dB HL= 0%    AzBIO sentences at 50 dB HL = 0%     TRADITIONAL AIDED:  CNC words at 50 dB HL= 0%    AzBIO sentences at 50 dB HL = 0%   AzBIO sentences at 50 dB HL with " 40 dB HL of noise = 0%     BAHA AIDED:   SRT = 40 dB HL  CNC words at 50 dB HL= 60%    AzBIO sentences at 50 dB HL = 98%   AzBIO sentences at 50 dB HL with 40 dB HL of noise = 97%   Aided thresholds were obtained from 35 dBHL - 65 dBHL for the frequencies of 250-6000 Hz.     BiCROS AIDED  SRT = 30  CNC words at 50 dB HL= 96%    AzBIO sentences at 50 dB HL = 100%   AzBIO sentences at 50 dB HL with 40 dB HL of noise = 100%   Aided thresholds were obtained from 35 dBHL - 45 dBHL for the frequencies of 250-6000 Hz.     IMPRESSION   Functional testing revealed excellent word and sentences recognition when noise is introduced to the better hearing ear utilizing a CROS and BAHA on softband. Tang is interested in a BiCROS at this time but is also curious about bilateral traditional amplification as a right tinnitus masker. He left with the amBX Audeo I-R BiCROS system to trial for a week. He will return in one week to return the CROS and trial Audeo I-R devices with the right as a tinnitus masker. He was made aware of the out of pocket trial cost.     We discussed tinnitus management in detail. After discussion he is concerned about bone conduction lack of potential assistance with tinnitus as well as concerns for placement on his head due to all day daily wear of a baseball hat to help with eye sensitivity. He will first try a BiCROS and then traditional hearing aids. 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 We briefly discussed tinnitus management with [facility] and Leniere treatment; however, I am unsure of appropriate referral guidelines in cases such as this.    TREATMENT PLAN:  1. Continue medical follow-up with our otologist/ENT team .  2. Follow up in one week to return BiCROS and trial traditional hearing aids  3.  Annual hearing assessments.  4. Contact clinic with questions or concerns at [phone].    Time Stamp: 04  minutes     Completed by:  Kandis Johnson, [facility]  Licensed Audiologist

## 2025-08-13 DIAGNOSIS — E78.2 MIXED HYPERLIPIDEMIA: ICD-10-CM

## 2025-08-13 DIAGNOSIS — R79.89 LOW VITAMIN B12 LEVEL: ICD-10-CM

## 2025-08-13 DIAGNOSIS — I10 PRIMARY HYPERTENSION: ICD-10-CM

## 2025-08-13 DIAGNOSIS — Z13.1 DIABETES MELLITUS SCREENING: ICD-10-CM

## 2025-08-13 DIAGNOSIS — Z13.29 THYROID DISORDER SCREENING: ICD-10-CM

## 2025-08-14 ENCOUNTER — DOCUMENTATION (OUTPATIENT)
Dept: BEHAVIORAL HEALTH | Facility: CLINIC | Age: 60
End: 2025-08-14
Payer: MEDICARE

## 2025-08-14 DIAGNOSIS — F33.1 MODERATE EPISODE OF RECURRENT MAJOR DEPRESSIVE DISORDER: ICD-10-CM

## 2025-08-14 DIAGNOSIS — G47.00 INSOMNIA, UNSPECIFIED TYPE: ICD-10-CM

## 2025-08-14 DIAGNOSIS — F41.1 GAD (GENERALIZED ANXIETY DISORDER): ICD-10-CM

## 2025-08-14 RX ORDER — ESCITALOPRAM OXALATE 20 MG/1
20 TABLET ORAL DAILY
Qty: 30 TABLET | Refills: 2 | Status: SHIPPED | OUTPATIENT
Start: 2025-08-14 | End: 2025-11-12

## 2025-08-14 RX ORDER — TRAZODONE HYDROCHLORIDE 50 MG/1
TABLET ORAL
Qty: 180 TABLET | Refills: 0 | Status: SHIPPED | OUTPATIENT
Start: 2025-08-14

## 2025-09-04 ENCOUNTER — APPOINTMENT (OUTPATIENT)
Dept: OPHTHALMOLOGY | Facility: CLINIC | Age: 60
End: 2025-09-04
Payer: MEDICARE

## 2025-09-18 ENCOUNTER — APPOINTMENT (OUTPATIENT)
Dept: OPHTHALMOLOGY | Facility: CLINIC | Age: 60
End: 2025-09-18
Payer: MEDICARE